# Patient Record
Sex: MALE | Race: BLACK OR AFRICAN AMERICAN | NOT HISPANIC OR LATINO | URBAN - METROPOLITAN AREA
[De-identification: names, ages, dates, MRNs, and addresses within clinical notes are randomized per-mention and may not be internally consistent; named-entity substitution may affect disease eponyms.]

---

## 2023-12-22 ENCOUNTER — OFFICE VISIT (OUTPATIENT)
Dept: PRIMARY CARE | Facility: CLINIC | Age: 43
End: 2023-12-22
Payer: COMMERCIAL

## 2023-12-22 ENCOUNTER — HOSPITAL ENCOUNTER (OUTPATIENT)
Dept: RADIOLOGY | Facility: HOSPITAL | Age: 43
Discharge: HOME | End: 2023-12-22
Payer: COMMERCIAL

## 2023-12-22 ENCOUNTER — LAB (OUTPATIENT)
Dept: LAB | Facility: LAB | Age: 43
End: 2023-12-22
Payer: COMMERCIAL

## 2023-12-22 VITALS
HEIGHT: 71 IN | SYSTOLIC BLOOD PRESSURE: 154 MMHG | OXYGEN SATURATION: 94 % | BODY MASS INDEX: 30.24 KG/M2 | DIASTOLIC BLOOD PRESSURE: 97 MMHG | WEIGHT: 216 LBS | HEART RATE: 105 BPM | TEMPERATURE: 96 F

## 2023-12-22 DIAGNOSIS — Z78.9 ALCOHOL USE: ICD-10-CM

## 2023-12-22 DIAGNOSIS — I10 PRIMARY HYPERTENSION: ICD-10-CM

## 2023-12-22 DIAGNOSIS — F19.90 POLYSUBSTANCE USE DISORDER: ICD-10-CM

## 2023-12-22 DIAGNOSIS — G89.29 CHRONIC PAIN OF RIGHT KNEE: ICD-10-CM

## 2023-12-22 DIAGNOSIS — E11.9 TYPE 2 DIABETES MELLITUS WITHOUT COMPLICATION, WITHOUT LONG-TERM CURRENT USE OF INSULIN (MULTI): ICD-10-CM

## 2023-12-22 DIAGNOSIS — E87.6 HYPOKALEMIA: ICD-10-CM

## 2023-12-22 DIAGNOSIS — E11.9 TYPE 2 DIABETES MELLITUS WITHOUT COMPLICATION, WITHOUT LONG-TERM CURRENT USE OF INSULIN (MULTI): Primary | ICD-10-CM

## 2023-12-22 DIAGNOSIS — M1A.9XX0 CHRONIC GOUT INVOLVING TOE OF RIGHT FOOT WITHOUT TOPHUS, UNSPECIFIED CAUSE: ICD-10-CM

## 2023-12-22 DIAGNOSIS — D64.9 ANEMIA, UNSPECIFIED TYPE: ICD-10-CM

## 2023-12-22 DIAGNOSIS — B35.0 TINEA BARBAE: ICD-10-CM

## 2023-12-22 DIAGNOSIS — M25.561 CHRONIC PAIN OF RIGHT KNEE: ICD-10-CM

## 2023-12-22 LAB
ALBUMIN SERPL BCP-MCNC: 3.9 G/DL (ref 3.4–5)
ALP SERPL-CCNC: 68 U/L (ref 33–120)
ALT SERPL W P-5'-P-CCNC: 29 U/L (ref 10–52)
ANION GAP SERPL CALC-SCNC: 15 MMOL/L (ref 10–20)
AST SERPL W P-5'-P-CCNC: 30 U/L (ref 9–39)
BILIRUB SERPL-MCNC: 0.6 MG/DL (ref 0–1.2)
BUN SERPL-MCNC: 22 MG/DL (ref 6–23)
CALCIUM SERPL-MCNC: 8.8 MG/DL (ref 8.6–10.6)
CHLORIDE SERPL-SCNC: 102 MMOL/L (ref 98–107)
CHOLEST SERPL-MCNC: 258 MG/DL (ref 0–199)
CHOLESTEROL/HDL RATIO: 4.1
CO2 SERPL-SCNC: 29 MMOL/L (ref 21–32)
CREAT SERPL-MCNC: 1.07 MG/DL (ref 0.5–1.3)
ERYTHROCYTE [DISTWIDTH] IN BLOOD BY AUTOMATED COUNT: 13.8 % (ref 11.5–14.5)
EST. AVERAGE GLUCOSE BLD GHB EST-MCNC: 120 MG/DL
FERRITIN SERPL-MCNC: 87 NG/ML (ref 20–300)
GFR SERPL CREATININE-BSD FRML MDRD: 87 ML/MIN/1.73M*2
GGT SERPL-CCNC: 154 U/L (ref 5–64)
GLUCOSE SERPL-MCNC: 97 MG/DL (ref 74–99)
HBA1C MFR BLD: 5.8 %
HCT VFR BLD AUTO: 50 % (ref 41–52)
HDLC SERPL-MCNC: 63.7 MG/DL
HGB BLD-MCNC: 16 G/DL (ref 13.5–17.5)
IRON SATN MFR SERPL: 56 % (ref 25–45)
IRON SERPL-MCNC: 209 UG/DL (ref 35–150)
LDLC SERPL CALC-MCNC: 152 MG/DL
LIPASE SERPL-CCNC: 29 U/L (ref 9–82)
MCH RBC QN AUTO: 29.9 PG (ref 26–34)
MCHC RBC AUTO-ENTMCNC: 32 G/DL (ref 32–36)
MCV RBC AUTO: 94 FL (ref 80–100)
NON HDL CHOLESTEROL: 194 MG/DL (ref 0–149)
NRBC BLD-RTO: 0 /100 WBCS (ref 0–0)
PLATELET # BLD AUTO: 187 X10*3/UL (ref 150–450)
POTASSIUM SERPL-SCNC: 4.1 MMOL/L (ref 3.5–5.3)
PROT SERPL-MCNC: 7.4 G/DL (ref 6.4–8.2)
RBC # BLD AUTO: 5.35 X10*6/UL (ref 4.5–5.9)
SODIUM SERPL-SCNC: 142 MMOL/L (ref 136–145)
TIBC SERPL-MCNC: 370 UG/DL (ref 240–445)
TRIGL SERPL-MCNC: 214 MG/DL (ref 0–149)
UIBC SERPL-MCNC: 161 UG/DL (ref 110–370)
VLDL: 43 MG/DL (ref 0–40)
WBC # BLD AUTO: 9 X10*3/UL (ref 4.4–11.3)

## 2023-12-22 PROCEDURE — 80053 COMPREHEN METABOLIC PANEL: CPT

## 2023-12-22 PROCEDURE — 82728 ASSAY OF FERRITIN: CPT

## 2023-12-22 PROCEDURE — 3077F SYST BP >= 140 MM HG: CPT | Performed by: STUDENT IN AN ORGANIZED HEALTH CARE EDUCATION/TRAINING PROGRAM

## 2023-12-22 PROCEDURE — 83550 IRON BINDING TEST: CPT

## 2023-12-22 PROCEDURE — 85027 COMPLETE CBC AUTOMATED: CPT

## 2023-12-22 PROCEDURE — 73564 X-RAY EXAM KNEE 4 OR MORE: CPT | Mod: RT

## 2023-12-22 PROCEDURE — 73562 X-RAY EXAM OF KNEE 3: CPT | Mod: RIGHT SIDE | Performed by: RADIOLOGY

## 2023-12-22 PROCEDURE — 83690 ASSAY OF LIPASE: CPT

## 2023-12-22 PROCEDURE — 80061 LIPID PANEL: CPT

## 2023-12-22 PROCEDURE — 73562 X-RAY EXAM OF KNEE 3: CPT | Mod: RT

## 2023-12-22 PROCEDURE — 83540 ASSAY OF IRON: CPT

## 2023-12-22 PROCEDURE — 3080F DIAST BP >= 90 MM HG: CPT | Performed by: STUDENT IN AN ORGANIZED HEALTH CARE EDUCATION/TRAINING PROGRAM

## 2023-12-22 PROCEDURE — 83036 HEMOGLOBIN GLYCOSYLATED A1C: CPT

## 2023-12-22 PROCEDURE — 36415 COLL VENOUS BLD VENIPUNCTURE: CPT

## 2023-12-22 PROCEDURE — 99205 OFFICE O/P NEW HI 60 MIN: CPT | Performed by: STUDENT IN AN ORGANIZED HEALTH CARE EDUCATION/TRAINING PROGRAM

## 2023-12-22 PROCEDURE — 82977 ASSAY OF GGT: CPT

## 2023-12-22 RX ORDER — ALLOPURINOL 300 MG/1
300 TABLET ORAL
COMMUNITY
Start: 2023-09-06 | End: 2023-12-22 | Stop reason: SDUPTHER

## 2023-12-22 RX ORDER — NAPROXEN 500 MG/1
TABLET ORAL EVERY 12 HOURS
COMMUNITY
Start: 2021-08-06

## 2023-12-22 RX ORDER — ALBUTEROL SULFATE 0.83 MG/ML
2.5 SOLUTION RESPIRATORY (INHALATION) EVERY 6 HOURS PRN
COMMUNITY
Start: 2019-06-10

## 2023-12-22 RX ORDER — GABAPENTIN 300 MG/1
300 CAPSULE ORAL
COMMUNITY
Start: 2023-09-18 | End: 2024-03-16

## 2023-12-22 RX ORDER — FOLIC ACID 1 MG/1
1 TABLET ORAL DAILY
COMMUNITY
Start: 2019-06-21

## 2023-12-22 RX ORDER — AMLODIPINE BESYLATE 5 MG/1
5 TABLET ORAL
COMMUNITY
Start: 2023-09-06 | End: 2023-12-22 | Stop reason: SDUPTHER

## 2023-12-22 RX ORDER — ATORVASTATIN CALCIUM 80 MG/1
1 TABLET, FILM COATED ORAL DAILY
COMMUNITY
Start: 2018-04-26

## 2023-12-22 RX ORDER — POTASSIUM CHLORIDE 20 MEQ/1
20 TABLET, EXTENDED RELEASE ORAL DAILY
Qty: 30 TABLET | Refills: 11 | Status: SHIPPED | OUTPATIENT
Start: 2023-12-22

## 2023-12-22 RX ORDER — TRIAMCINOLONE ACETONIDE 1 MG/G
CREAM TOPICAL
COMMUNITY
Start: 2018-02-02

## 2023-12-22 RX ORDER — OMEPRAZOLE 40 MG/1
1 CAPSULE, DELAYED RELEASE ORAL DAILY
COMMUNITY
Start: 2021-08-06

## 2023-12-22 RX ORDER — COLCHICINE 0.6 MG/1
0.6 TABLET ORAL
COMMUNITY
Start: 2014-06-25 | End: 2024-03-16

## 2023-12-22 RX ORDER — LORATADINE 10 MG/1
10 TABLET ORAL
COMMUNITY
Start: 2013-09-09

## 2023-12-22 RX ORDER — HYDROCHLOROTHIAZIDE 25 MG/1
1 TABLET ORAL DAILY
COMMUNITY
Start: 2014-10-13 | End: 2023-12-22 | Stop reason: WASHOUT

## 2023-12-22 RX ORDER — NEBULIZER AND COMPRESSOR
EACH MISCELLANEOUS
COMMUNITY
Start: 2023-03-01

## 2023-12-22 RX ORDER — TRAZODONE HYDROCHLORIDE 50 MG/1
TABLET ORAL
COMMUNITY
Start: 2023-08-03

## 2023-12-22 RX ORDER — LOSARTAN POTASSIUM AND HYDROCHLOROTHIAZIDE 12.5; 5 MG/1; MG/1
1 TABLET ORAL
Qty: 30 TABLET | Refills: 11 | Status: SHIPPED | OUTPATIENT
Start: 2023-12-22

## 2023-12-22 RX ORDER — FERROUS SULFATE TAB 325 MG (65 MG ELEMENTAL FE) 325 (65 FE) MG
TAB ORAL
COMMUNITY
Start: 2023-08-03

## 2023-12-22 RX ORDER — ALBUTEROL SULFATE 90 UG/1
AEROSOL, METERED RESPIRATORY (INHALATION)
COMMUNITY
Start: 2018-07-30 | End: 2024-03-01

## 2023-12-22 RX ORDER — ASPIRIN 81 MG
1 TABLET, DELAYED RELEASE (ENTERIC COATED) ORAL DAILY
COMMUNITY
Start: 2022-08-19

## 2023-12-22 RX ORDER — INDOMETHACIN 50 MG/1
50 CAPSULE ORAL
COMMUNITY
Start: 2013-09-09

## 2023-12-22 RX ORDER — ALLOPURINOL 300 MG/1
300 TABLET ORAL
Qty: 30 TABLET | Refills: 11 | Status: SHIPPED | OUTPATIENT
Start: 2023-12-22

## 2023-12-22 RX ORDER — ERGOCALCIFEROL 1.25 MG/1
CAPSULE ORAL
COMMUNITY
Start: 2023-10-30

## 2023-12-22 RX ORDER — METFORMIN HYDROCHLORIDE 500 MG/1
TABLET ORAL
COMMUNITY
Start: 2021-08-13

## 2023-12-22 RX ORDER — POTASSIUM CHLORIDE 20 MEQ/1
TABLET, EXTENDED RELEASE ORAL
COMMUNITY
Start: 2021-08-13 | End: 2023-12-22 | Stop reason: SDUPTHER

## 2023-12-22 RX ORDER — METOPROLOL TARTRATE 50 MG/1
1 TABLET ORAL 2 TIMES DAILY
COMMUNITY
Start: 2021-08-06

## 2023-12-22 RX ORDER — FLUTICASONE PROPIONATE 110 UG/1
2 AEROSOL, METERED RESPIRATORY (INHALATION) 2 TIMES DAILY
COMMUNITY
Start: 2015-12-10 | End: 2024-03-01

## 2023-12-22 RX ORDER — KETOCONAZOLE 20 MG/ML
SHAMPOO, SUSPENSION TOPICAL 2 TIMES WEEKLY
Qty: 120 ML | Refills: 0 | Status: SHIPPED | OUTPATIENT
Start: 2023-12-25 | End: 2024-02-20

## 2023-12-22 RX ORDER — CETIRIZINE HYDROCHLORIDE 10 MG/1
10 TABLET ORAL
COMMUNITY
Start: 2022-01-24

## 2023-12-22 RX ORDER — HYDROCORTISONE 25 MG/G
CREAM TOPICAL 2 TIMES DAILY PRN
Qty: 30 G | Refills: 11 | Status: SHIPPED | OUTPATIENT
Start: 2023-12-22 | End: 2024-12-21

## 2023-12-22 RX ORDER — TIZANIDINE 4 MG/1
4 TABLET ORAL EVERY 6 HOURS PRN
COMMUNITY
Start: 2023-06-05 | End: 2023-12-22 | Stop reason: SDUPTHER

## 2023-12-22 RX ORDER — LEVOFLOXACIN 750 MG/1
1 TABLET ORAL DAILY
COMMUNITY
Start: 2022-06-06 | End: 2023-12-22 | Stop reason: ALTCHOICE

## 2023-12-22 RX ORDER — ASPIRIN 325 MG
TABLET, DELAYED RELEASE (ENTERIC COATED) ORAL
COMMUNITY
Start: 2021-08-13

## 2023-12-22 RX ORDER — IBUPROFEN 800 MG/1
TABLET ORAL
COMMUNITY
Start: 2019-04-01 | End: 2023-12-22 | Stop reason: SINTOL

## 2023-12-22 RX ORDER — MULTIVIT/IRON SULF/FOLIC ACID 15MG-0.4MG
TABLET ORAL
COMMUNITY
Start: 2023-10-30

## 2023-12-22 RX ORDER — HYDROXYZINE HYDROCHLORIDE 25 MG/1
25 TABLET, FILM COATED ORAL EVERY 8 HOURS PRN
COMMUNITY
Start: 2023-09-18

## 2023-12-22 RX ORDER — AMLODIPINE BESYLATE 5 MG/1
5 TABLET ORAL
Qty: 30 TABLET | Refills: 12 | Status: SHIPPED | OUTPATIENT
Start: 2023-12-22 | End: 2025-01-02

## 2023-12-22 RX ORDER — TIZANIDINE 4 MG/1
4 TABLET ORAL EVERY 6 HOURS PRN
Qty: 30 TABLET | Refills: 2 | Status: SHIPPED | OUTPATIENT
Start: 2023-12-22 | End: 2024-02-20

## 2023-12-22 RX ORDER — LOSARTAN POTASSIUM AND HYDROCHLOROTHIAZIDE 12.5; 5 MG/1; MG/1
1 TABLET ORAL
COMMUNITY
Start: 2023-09-06 | End: 2023-12-22 | Stop reason: SDUPTHER

## 2023-12-22 ASSESSMENT — PAIN SCALES - GENERAL: PAINLEVEL: 10-WORST PAIN EVER

## 2023-12-22 NOTE — PROGRESS NOTES
"  Medical record number: [id number]    Subjective   Patient ID: Diego French is a 43 y.o. male who presents for Establish Care (Pt needs a physical and blood work ).    1. Right knee pain  Left knee \"done\" a few years  Right knee swelling, locking,   Last fall Tuesday when the right knee gave out  Used to work landscaping  This does not feel like gout pain    2. HTN  Has been inconsistent in use  Excessive alcohol use: drinks every night from 7-8 pm until he blacks out  Also some PCP use, up to 2 times a week    3. HLD  On atorvastatin 80 mg    4. Anemia  Hgb down to 12 recently    5. Hypokalemia  3.3 most recently    6. Polysubstance use  ethanol, PCP  Black out drinking  PCP use up to twice a week  Using with friends, not housemates    7. 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 Tinea barbae  3 lesions of the chin, lateral to mouth  Not angular location  Pruritic, bleed once he picks them         Social History:  - Lives with wife and son  - Feels safe YES  - Tobacco or vapin cigarettes a day, at most 1 ppd since age 13: total pack years >35  - Alcohol use: daily, black out drinking in the evenings  - Marijuana use: no  - Illicit drug use: PCP    Family History:  Did not discuss    Past Medical History:  Did not discuss    Past Surgical History:  Did not discuss    Review of Systems   All other systems reviewed and are negative.      Objective   BP (!) 154/97 (BP Location: Left arm, Patient Position: Sitting)   Pulse 105   Temp 35.6 °C (96 °F) (Temporal)   Ht 1.803 m (5' 11\")   Wt 98 kg (216 lb)   SpO2 94%   BMI 30.13 kg/m²     Physical Exam  Vitals reviewed.   Constitutional:       General: He is not in acute distress.  HENT:      Head: Normocephalic and atraumatic.      Nose: Nose normal.      Mouth/Throat:      Mouth: Mucous membranes are moist.      Pharynx: Oropharynx is clear.        Comments: Raised, mildly erythematous round lesions that contain central hair follicles. Some bleeding from areas " that were picked by patient. Facial hair present. No alopecia.  Eyes:      Extraocular Movements: Extraocular movements intact.      Conjunctiva/sclera: Conjunctivae normal.      Pupils: Pupils are equal, round, and reactive to light.   Cardiovascular:      Rate and Rhythm: Normal rate.      Pulses: Normal pulses.      Heart sounds: Normal heart sounds. No murmur heard.     No friction rub. No gallop.   Pulmonary:      Effort: Pulmonary effort is normal.      Breath sounds: Normal breath sounds.   Abdominal:      General: Abdomen is flat. Bowel sounds are normal.      Palpations: Abdomen is soft.   Musculoskeletal:      Cervical back: Normal range of motion and neck supple.      Right knee: Swelling, bony tenderness and crepitus present. No erythema, ecchymosis or lacerations. Decreased range of motion. Tenderness present over the patellar tendon. No ACL laxity or PCL laxity. Normal meniscus and normal patellar mobility.      Instability Tests: Anterior drawer test negative. Posterior drawer test negative. Anterior Lachman test negative. Medial Bette test negative and lateral Bette test negative.      Left knee: Normal.   Skin:     General: Skin is warm and dry.      Capillary Refill: Capillary refill takes less than 2 seconds.   Neurological:      General: No focal deficit present.      Mental Status: He is alert and oriented to person, place, and time.   Psychiatric:         Mood and Affect: Mood normal.         Behavior: Behavior normal.         PHYSICAL EXAM:    Assessment/Plan   Problem List Items Addressed This Visit    None  Visit Diagnoses         Codes    Type 2 diabetes mellitus without complication, without long-term current use of insulin (CMS/LTAC, located within [facility])    -  Primary E11.9    Relevant Orders    Hemoglobin A1c    Primary hypertension     I10    Relevant Medications    losartan-hydrochlorothiazide (Hyzaar) 50-12.5 mg tablet    amLODIPine (Norvasc) 5 mg tablet    Other Relevant Orders    CBC    Chronic  pain of right knee     M25.561, G89.29    Relevant Medications    tiZANidine (Zanaflex) 4 mg tablet    Other Relevant Orders    XR knee right 3 views (Completed)    Alcohol use     Z78.9    Relevant Orders    CBC    Comprehensive metabolic panel    Gamma GT    Lipase    Lipid panel    Polysubstance use disorder     F19.90    Chronic gout involving toe of right foot without tophus, unspecified cause     M1A.9XX0    Relevant Medications    allopurinol (Zyloprim) 300 mg tablet    Hypokalemia     E87.6    Relevant Medications    potassium chloride CR 20 mEq ER tablet    Anemia, unspecified type     D64.9    Relevant Orders    CBC    Ferritin    Iron and TIBC    Tinea barbae     B35.0    Relevant Medications    ketoconazole (NIZOral) 2 % shampoo (Start on 12/25/2023)    hydrocortisone 2.5 % cream                   -------------------------------------------------------------------------------    **This note was entered into the electronic medical record using Dragon medical dictation software, and was not edited thereafter. Please excuse any errors of spelling or grammar.**    -------------------------------------------------------------------------------    OVERALL PLAN:  [ ] Right knee pain: differential includes patellofemoral syndrome, gout, degenerative joint disease - c/w naproxen, XR today  [ ] CBC + iron studies  [ ] CMP, lipids, GGT, lipase  [ ]     AFTER VISIT CONSIDERATIONS:  [ ] PT following XR results  [ ] FUV for pain of right knee  [ ] MRI if indicated for right kne pain  [ ] discussion of anxiety and depression  [ ] Health maintenance for future visits: age 48, needs LDCT, colonoscopy  [ ] complete PMH, PSH, FH

## 2024-02-19 DIAGNOSIS — M25.561 CHRONIC PAIN OF RIGHT KNEE: ICD-10-CM

## 2024-02-19 DIAGNOSIS — G89.29 CHRONIC PAIN OF RIGHT KNEE: ICD-10-CM

## 2024-02-19 DIAGNOSIS — B35.0 TINEA BARBAE: ICD-10-CM

## 2024-02-20 RX ORDER — KETOCONAZOLE 20 MG/ML
SHAMPOO, SUSPENSION TOPICAL
Qty: 120 ML | Refills: 0 | Status: SHIPPED | OUTPATIENT
Start: 2024-02-20

## 2024-02-20 RX ORDER — TIZANIDINE 4 MG/1
4 TABLET ORAL EVERY 6 HOURS PRN
Qty: 30 TABLET | Refills: 2 | Status: SHIPPED | OUTPATIENT
Start: 2024-02-20 | End: 2024-05-16

## 2024-02-28 DIAGNOSIS — J45.909 ASTHMA, UNSPECIFIED ASTHMA SEVERITY, UNSPECIFIED WHETHER COMPLICATED, UNSPECIFIED WHETHER PERSISTENT (HHS-HCC): Primary | ICD-10-CM

## 2024-02-28 NOTE — TELEPHONE ENCOUNTER
Pt called requesting refill of albuterol inhaler, and fluticasone inhaler. Orders pended via pharmacy. Routed to provider via this nurse.

## 2024-03-01 ENCOUNTER — DOCUMENTATION (OUTPATIENT)
Dept: PRIMARY CARE | Facility: CLINIC | Age: 44
End: 2024-03-01
Payer: COMMERCIAL

## 2024-03-01 RX ORDER — DEXAMETHASONE 4 MG/1
2 TABLET ORAL 2 TIMES DAILY
Qty: 36 G | Refills: 3 | Status: SHIPPED | OUTPATIENT
Start: 2024-03-01

## 2024-03-01 RX ORDER — ALBUTEROL SULFATE 90 UG/1
AEROSOL, METERED RESPIRATORY (INHALATION)
Qty: 36 G | Refills: 1 | Status: SHIPPED | OUTPATIENT
Start: 2024-03-01

## 2024-03-01 NOTE — PROGRESS NOTES
Spoke with patient over the phone, following receipt of message that he is having knee pain. This is a chronic issue, and there is some concern for gout. He also endorses a trauma to his right great toe with pain and cannot move it right now. The right knee is swollen, warm, red, and he also endorses fever and chills. Took NyQuil last night and still feeling poorly. Onset of new knee pain was acute 2 days ago (on top of chronic pain for years).     I am concerned for septic joint. I do not have PM appointments. There are no open slots in our clinic today, and besides that, I want him to report to an ED for evaluation of septic knee joint, and whether he warrants effusion drainage and analysis, XR, analgesia, etc.    Patient verbalized understanding, but will go to the ED in the morning, as he knows Friday evening ER will be very full.

## 2024-03-07 ENCOUNTER — OFFICE VISIT (OUTPATIENT)
Dept: OPHTHALMOLOGY | Facility: CLINIC | Age: 44
End: 2024-03-07
Payer: COMMERCIAL

## 2024-03-07 DIAGNOSIS — H52.203 MYOPIA OF BOTH EYES WITH ASTIGMATISM AND PRESBYOPIA: ICD-10-CM

## 2024-03-07 DIAGNOSIS — H52.213 IRREGULAR ASTIGMATISM OF BOTH EYES: ICD-10-CM

## 2024-03-07 DIAGNOSIS — H18.603 KERATOCONUS OF BOTH EYES: Primary | ICD-10-CM

## 2024-03-07 DIAGNOSIS — H52.13 MYOPIA OF BOTH EYES WITH ASTIGMATISM AND PRESBYOPIA: ICD-10-CM

## 2024-03-07 DIAGNOSIS — H35.413 LATTICE DEGENERATION OF BOTH RETINAS: ICD-10-CM

## 2024-03-07 DIAGNOSIS — H52.4 MYOPIA OF BOTH EYES WITH ASTIGMATISM AND PRESBYOPIA: ICD-10-CM

## 2024-03-07 PROCEDURE — 92004 COMPRE OPH EXAM NEW PT 1/>: CPT | Performed by: STUDENT IN AN ORGANIZED HEALTH CARE EDUCATION/TRAINING PROGRAM

## 2024-03-07 PROCEDURE — 92015 DETERMINE REFRACTIVE STATE: CPT | Performed by: STUDENT IN AN ORGANIZED HEALTH CARE EDUCATION/TRAINING PROGRAM

## 2024-03-07 PROCEDURE — 92025 CPTRIZED CORNEAL TOPOGRAPHY: CPT | Performed by: STUDENT IN AN ORGANIZED HEALTH CARE EDUCATION/TRAINING PROGRAM

## 2024-03-07 ASSESSMENT — EXTERNAL EXAM - LEFT EYE: OS_EXAM: NORMAL

## 2024-03-07 ASSESSMENT — VISUAL ACUITY
METHOD: SNELLEN - LINEAR
OS_PH_SC: 20/70-
OD_PH_SC: 20/60-
OS_SC: 20/80
OD_SC: 20/150

## 2024-03-07 ASSESSMENT — CUP TO DISC RATIO
OS_RATIO: .3
OD_RATIO: .3

## 2024-03-07 ASSESSMENT — REFRACTION_MANIFEST
OS_SPHERE: -2.25
OD_CYLINDER: -2.75
OD_AXIS: 049
OD_ADD: +1.50
OS_SPHERE: -2.50
OS_CYLINDER: -5.75
OD_SPHERE: -1.75
OD_CYLINDER: -2.50
OS_AXIS: 135
OD_AXIS: 045
OS_ADD: +1.50
OD_SPHERE: -1.75
OS_AXIS: 135
OS_CYLINDER: -6.00
METHOD_AUTOREFRACTION: 1

## 2024-03-07 ASSESSMENT — CONF VISUAL FIELD
OD_NORMAL: 1
OD_INFERIOR_TEMPORAL_RESTRICTION: 0
OS_SUPERIOR_TEMPORAL_RESTRICTION: 0
OS_SUPERIOR_NASAL_RESTRICTION: 0
OD_INFERIOR_NASAL_RESTRICTION: 0
OD_SUPERIOR_NASAL_RESTRICTION: 0
OS_INFERIOR_TEMPORAL_RESTRICTION: 0
OS_NORMAL: 1
OS_INFERIOR_NASAL_RESTRICTION: 0
METHOD: COUNTING FINGERS
OD_SUPERIOR_TEMPORAL_RESTRICTION: 0

## 2024-03-07 ASSESSMENT — ENCOUNTER SYMPTOMS
CONSTITUTIONAL NEGATIVE: 0
ENDOCRINE NEGATIVE: 0
RESPIRATORY NEGATIVE: 0
MUSCULOSKELETAL NEGATIVE: 0
EYES NEGATIVE: 0
ALLERGIC/IMMUNOLOGIC NEGATIVE: 0
NEUROLOGICAL NEGATIVE: 0
PSYCHIATRIC NEGATIVE: 0
CARDIOVASCULAR NEGATIVE: 1
HEMATOLOGIC/LYMPHATIC NEGATIVE: 0
GASTROINTESTINAL NEGATIVE: 0

## 2024-03-07 ASSESSMENT — TONOMETRY
OD_IOP_MMHG: 14
OS_IOP_MMHG: 12
IOP_METHOD: GOLDMANN APPLANATION

## 2024-03-07 ASSESSMENT — SLIT LAMP EXAM - LIDS
COMMENTS: TR MGD
COMMENTS: TR MGD

## 2024-03-07 ASSESSMENT — EXTERNAL EXAM - RIGHT EYE: OD_EXAM: NORMAL

## 2024-03-07 NOTE — PROGRESS NOTES
Assessment/Plan   Diagnoses and all orders for this visit:  Keratoconus of both eyes  -     Corneal Topography - OU - Both Eyes  Irregular astigmatism of both eyes  -     Corneal Topography - OU - Both Eyes  Myopia of both eyes with astigmatism and presbyopia    Previously seen by Dr. Taylor in 2019 and was referred to Dr. Garcia for scleral lens fitting. No notes are seen in the system, however patient reports he tried sclerals before and could not get them in. Explained that his vision would likely be improved with scleral lenses. Declined scleral lens fitting. Explained importance of not rubbing eyes. New spec rx released today per patient request. Ocular health wnl for age OU. Monitor 1 year or sooner prn if interested in CL fit.   Stable topography measurements to previous without signs of progression.     Lattice degeneration of both retinas  The nature of lattice degeneration was discussed with the patient, including the increased risk of retinal breaks and retinal detachment. The patient was advised to return immediately for new flashes or floaters.     RTC 1 year for annual with DFE, MRX, and CL exam if interested

## 2024-03-08 LAB
CENTRAL CORNEA THICKNESS (OD): 490 MICRONS
CENTRAL CORNEA THICKNESS (OS): 463 MICRONS
KMAX (OD): 48.4 DIOPTERS
KMAX (OS): 54.9 DIOPTERS
PACH THINNEST (OD): 481 MICRONS
PACH THINNEST (OS): 431 MICRONS
SIMK FLAT (OD): 44.2 DIOPTERS
SIMK FLAT (OS): 45.6 DIOPTERS
SIMK STEEP (OD): 46.7 DIOPTERS
SIMK STEEP (OS): 51 DIOPTERS

## 2024-03-11 ENCOUNTER — TELEPHONE (OUTPATIENT)
Dept: PRIMARY CARE | Facility: CLINIC | Age: 44
End: 2024-03-11
Payer: COMMERCIAL

## 2024-03-11 NOTE — TELEPHONE ENCOUNTER
Communication received pt requesting callback r/t obtaining rx for pain med r/t B/L knee pain with edema. Noted provider spoke with pt 3 days ago, and instructed pt to go to ED d/t concerns of septic joint, and/or other possible needs of treatment, in which pt agreed he would go next morning when ED not so busy. Call placed to pt to inform need to be seen for further examination. Pt stated he did not want to go to ED d/t several previous trips when they would just give an XR and send home. Pt stated he has to catch the bus and he feels that is wasting money, that he doesn't really have to waste on bus tickets. Attempt to schedule pt prior to March 20th appt, but that date is earliest date available with any provider in clinic. Pt requests provider to send rx for pain in the meantime. Message sent to provider.

## 2024-03-11 NOTE — PROGRESS NOTES
Called the listed phone number, which referenced a different phone number [phone] where the patient could be reached. I called this number twice with no answer.    This was in response to a message patient left asking for medication in regard to his ongoing knee pain. When we last spoke, he described symptoms that were concerning for septic knee arthritis. I would very much like to speak to him to see if symptoms are still concerning and if he warrants ED assessment. Both during our previous phone conversation and from the message that he left, he is rather adamant about not going to the emergency room as he feels that they never adequately assess or treat his pain.  I am still concerned that he has a septic arthritis.    Though we could theoretically aspirate his knee in clinic, send off the aspirate for culture, and order blood cultures and other workup, I am adamant that this ought to just be done in the emergency room and not here in clinic.  I feel that doing so would not be in keeping with the standard of care.    Will attempt to speak with the patient again tomorrow.

## 2024-03-20 ENCOUNTER — HOSPITAL ENCOUNTER (EMERGENCY)
Facility: HOSPITAL | Age: 44
Discharge: HOME | End: 2024-03-20
Payer: MEDICARE

## 2024-03-20 ENCOUNTER — CLINICAL SUPPORT (OUTPATIENT)
Dept: EMERGENCY MEDICINE | Facility: HOSPITAL | Age: 44
End: 2024-03-20
Payer: MEDICARE

## 2024-03-20 ENCOUNTER — APPOINTMENT (OUTPATIENT)
Dept: RADIOLOGY | Facility: HOSPITAL | Age: 44
End: 2024-03-20
Payer: MEDICARE

## 2024-03-20 VITALS
RESPIRATION RATE: 16 BRPM | TEMPERATURE: 98.4 F | HEART RATE: 100 BPM | OXYGEN SATURATION: 95 % | SYSTOLIC BLOOD PRESSURE: 142 MMHG | DIASTOLIC BLOOD PRESSURE: 87 MMHG

## 2024-03-20 DIAGNOSIS — R07.9 CHEST PAIN, UNSPECIFIED TYPE: ICD-10-CM

## 2024-03-20 DIAGNOSIS — M25.461 EFFUSION OF RIGHT KNEE: Primary | ICD-10-CM

## 2024-03-20 LAB
ALBUMIN SERPL BCP-MCNC: 4.1 G/DL (ref 3.4–5)
ALP SERPL-CCNC: 69 U/L (ref 33–120)
ALT SERPL W P-5'-P-CCNC: 27 U/L (ref 10–52)
ANION GAP SERPL CALC-SCNC: 15 MMOL/L (ref 10–20)
AST SERPL W P-5'-P-CCNC: 33 U/L (ref 9–39)
ATRIAL RATE: 106 BPM
BASOPHILS # BLD AUTO: 0.07 X10*3/UL (ref 0–0.1)
BASOPHILS NFR BLD AUTO: 0.9 %
BILIRUB SERPL-MCNC: 0.4 MG/DL (ref 0–1.2)
BUN SERPL-MCNC: 23 MG/DL (ref 6–23)
CALCIUM SERPL-MCNC: 9.5 MG/DL (ref 8.6–10.6)
CARDIAC TROPONIN I PNL SERPL HS: 19 NG/L (ref 0–53)
CHLORIDE SERPL-SCNC: 102 MMOL/L (ref 98–107)
CO2 SERPL-SCNC: 27 MMOL/L (ref 21–32)
CREAT SERPL-MCNC: 1.29 MG/DL (ref 0.5–1.3)
EGFRCR SERPLBLD CKD-EPI 2021: 70 ML/MIN/1.73M*2
EOSINOPHIL # BLD AUTO: 0.14 X10*3/UL (ref 0–0.7)
EOSINOPHIL NFR BLD AUTO: 1.7 %
ERYTHROCYTE [DISTWIDTH] IN BLOOD BY AUTOMATED COUNT: 12.7 % (ref 11.5–14.5)
GLUCOSE SERPL-MCNC: 105 MG/DL (ref 74–99)
HCT VFR BLD AUTO: 48.1 % (ref 41–52)
HGB BLD-MCNC: 16.9 G/DL (ref 13.5–17.5)
IMM GRANULOCYTES # BLD AUTO: 0.03 X10*3/UL (ref 0–0.7)
IMM GRANULOCYTES NFR BLD AUTO: 0.4 % (ref 0–0.9)
LYMPHOCYTES # BLD AUTO: 1.8 X10*3/UL (ref 1.2–4.8)
LYMPHOCYTES NFR BLD AUTO: 22.4 %
MCH RBC QN AUTO: 31 PG (ref 26–34)
MCHC RBC AUTO-ENTMCNC: 35.1 G/DL (ref 32–36)
MCV RBC AUTO: 88 FL (ref 80–100)
MONOCYTES # BLD AUTO: 0.79 X10*3/UL (ref 0.1–1)
MONOCYTES NFR BLD AUTO: 9.8 %
NEUTROPHILS # BLD AUTO: 5.22 X10*3/UL (ref 1.2–7.7)
NEUTROPHILS NFR BLD AUTO: 64.8 %
NRBC BLD-RTO: 0 /100 WBCS (ref 0–0)
P AXIS: 60 DEGREES
P OFFSET: 191 MS
P ONSET: 140 MS
PLATELET # BLD AUTO: 240 X10*3/UL (ref 150–450)
POTASSIUM SERPL-SCNC: 5 MMOL/L (ref 3.5–5.3)
PR INTERVAL: 160 MS
PROT SERPL-MCNC: 8.3 G/DL (ref 6.4–8.2)
Q ONSET: 220 MS
QRS COUNT: 18 BEATS
QRS DURATION: 90 MS
QT INTERVAL: 314 MS
QTC CALCULATION(BAZETT): 417 MS
QTC FREDERICIA: 379 MS
R AXIS: 13 DEGREES
RBC # BLD AUTO: 5.45 X10*6/UL (ref 4.5–5.9)
SODIUM SERPL-SCNC: 139 MMOL/L (ref 136–145)
T AXIS: 61 DEGREES
T OFFSET: 377 MS
VENTRICULAR RATE: 106 BPM
WBC # BLD AUTO: 8.1 X10*3/UL (ref 4.4–11.3)

## 2024-03-20 PROCEDURE — 71046 X-RAY EXAM CHEST 2 VIEWS: CPT | Mod: FOREIGN READ | Performed by: RADIOLOGY

## 2024-03-20 PROCEDURE — 93005 ELECTROCARDIOGRAM TRACING: CPT

## 2024-03-20 PROCEDURE — 36415 COLL VENOUS BLD VENIPUNCTURE: CPT | Performed by: PHYSICIAN ASSISTANT

## 2024-03-20 PROCEDURE — 80053 COMPREHEN METABOLIC PANEL: CPT | Performed by: PHYSICIAN ASSISTANT

## 2024-03-20 PROCEDURE — 96374 THER/PROPH/DIAG INJ IV PUSH: CPT

## 2024-03-20 PROCEDURE — 2500000004 HC RX 250 GENERAL PHARMACY W/ HCPCS (ALT 636 FOR OP/ED): Mod: SE | Performed by: PHYSICIAN ASSISTANT

## 2024-03-20 PROCEDURE — 93010 ELECTROCARDIOGRAM REPORT: CPT | Performed by: PHYSICIAN ASSISTANT

## 2024-03-20 PROCEDURE — 99285 EMERGENCY DEPT VISIT HI MDM: CPT | Performed by: PHYSICIAN ASSISTANT

## 2024-03-20 PROCEDURE — 85025 COMPLETE CBC W/AUTO DIFF WBC: CPT | Performed by: PHYSICIAN ASSISTANT

## 2024-03-20 PROCEDURE — 71046 X-RAY EXAM CHEST 2 VIEWS: CPT

## 2024-03-20 PROCEDURE — 84484 ASSAY OF TROPONIN QUANT: CPT | Performed by: PHYSICIAN ASSISTANT

## 2024-03-20 PROCEDURE — 73562 X-RAY EXAM OF KNEE 3: CPT | Mod: RT

## 2024-03-20 PROCEDURE — 99284 EMERGENCY DEPT VISIT MOD MDM: CPT | Mod: 25

## 2024-03-20 RX ORDER — ACETAMINOPHEN 325 MG/1
650 TABLET ORAL EVERY 6 HOURS PRN
Qty: 20 TABLET | Refills: 0 | Status: SHIPPED | OUTPATIENT
Start: 2024-03-20 | End: 2024-03-25

## 2024-03-20 RX ORDER — NAPROXEN 500 MG/1
500 TABLET ORAL
Qty: 14 TABLET | Refills: 0 | Status: SHIPPED | OUTPATIENT
Start: 2024-03-20 | End: 2024-03-27

## 2024-03-20 RX ORDER — CYCLOBENZAPRINE HCL 10 MG
10 TABLET ORAL 3 TIMES DAILY PRN
Qty: 9 TABLET | Refills: 0 | Status: SHIPPED | OUTPATIENT
Start: 2024-03-20 | End: 2024-03-23

## 2024-03-20 RX ORDER — KETOROLAC TROMETHAMINE 15 MG/ML
15 INJECTION, SOLUTION INTRAMUSCULAR; INTRAVENOUS ONCE
Status: COMPLETED | OUTPATIENT
Start: 2024-03-20 | End: 2024-03-20

## 2024-03-20 RX ADMIN — KETOROLAC TROMETHAMINE 15 MG: 15 INJECTION, SOLUTION INTRAMUSCULAR; INTRAVENOUS at 15:44

## 2024-03-20 ASSESSMENT — HEART SCORE
TROPONIN: LESS THAN OR EQUAL TO NORMAL LIMIT
AGE: 45-64
HISTORY: SLIGHTLY SUSPICIOUS
ECG: NORMAL
RISK FACTORS: 1-2 RISK FACTORS
HEART SCORE: 2

## 2024-03-20 ASSESSMENT — COLUMBIA-SUICIDE SEVERITY RATING SCALE - C-SSRS
6. HAVE YOU EVER DONE ANYTHING, STARTED TO DO ANYTHING, OR PREPARED TO DO ANYTHING TO END YOUR LIFE?: NO
2. HAVE YOU ACTUALLY HAD ANY THOUGHTS OF KILLING YOURSELF?: NO
1. IN THE PAST MONTH, HAVE YOU WISHED YOU WERE DEAD OR WISHED YOU COULD GO TO SLEEP AND NOT WAKE UP?: NO

## 2024-03-20 ASSESSMENT — PAIN DESCRIPTION - DESCRIPTORS: DESCRIPTORS: BURNING

## 2024-03-20 NOTE — Clinical Note
Diego French was seen and treated in our emergency department on 3/20/2024.  He may return to work on 03/21/2024.       If you have any questions or concerns, please don't hesitate to call.      Lamar Bishop PA-C

## 2024-03-20 NOTE — ED PROVIDER NOTES
This is a 43-year-old male with past medical history of hypertension as well as chronic right knee pain who presents to the ED with multiple medical complaints including chest pain that began yesterday as well as his chronic right knee pain.  Patient states that the chest pain began yesterday while he was resting.  It is a tingling sharp pain on the left side of his chest that does not radiate and is not worse with movement.  No associated shortness of breath, neck pain, arm pain, palpitations, diaphoresis, abdominal pain, nausea, vomiting, or diarrhea.  He does state it feels somewhat like gas.  He denies any abdominal additionally patient endorses having chronic knee pain.  He states that he had an outpatient appointment today, however at the wrong time for the appointment so he missed it.  He endorses chronic right knee pain and swelling without any associated redness.  No acute changes to this pain.  No new injuries or trauma.  No associated fevers or chills.      History provided by:  Patient   used: No             Visit Vitals  /87   Pulse 100   Temp 36.9 °C (98.4 °F) (Temporal)   Resp 16   SpO2 95%   Smoking Status Some Days          Physical Exam     Physical exam:   General: Vitals noted, no distress. Afebrile.   EENT:  Hearing grossly intact. Normal phonation. MMM. Airway patient. PERRL. EOMI.   Neck: No midline tenderness or paraspinal tenderness. FROM.   Cardiac: Regular, rate, rhythm. Normal S1 and S2.  No murmurs, gallops, rubs.   Pulmonary: Good air exchange. Lungs clear bilaterally. No wheezes, rhonchi, rales. No accessory muscle use.  No reproducible chest wall tenderness to palpation.  Abdomen: Soft, nonsurgical. Nontender. No peritoneal signs. Normoactive bowel sounds.   Back: No CVA tenderness. No midline tenderness or paraspinal tenderness. No obvious deformity.   Extremities: No peripheral edema.  Full range of motion. Moves all extremities freely.  Moderate sized  joint effusion to the right knee with associated tenderness palpation over the medial joint line.  No ligamental laxity.  Full range of motion of the knee without difficulty.  No associated erythema or excess warmth.   Skin: No rash. Warm and Dry.   Neuro: No focal neurologic deficits. CN 2-12 grossly intact. Sensation equal bilaterally. No weakness.         Labs Reviewed   COMPREHENSIVE METABOLIC PANEL - Abnormal       Result Value    Glucose 105 (*)     Sodium 139      Potassium 5.0      Chloride 102      Bicarbonate 27      Anion Gap 15      Urea Nitrogen 23      Creatinine 1.29      eGFR 70      Calcium 9.5      Albumin 4.1      Alkaline Phosphatase 69      Total Protein 8.3 (*)     AST 33      Bilirubin, Total 0.4      ALT 27     TROPONIN I, HIGH SENSITIVITY - Normal    Troponin I, High Sensitivity 19      Narrative:     Less than 99th percentile of normal range cutoff-  Female and children under 18 years old <35 ng/L; Male <54 ng/L: Negative  Repeat testing should be performed if clinically indicated.     Female and children under 18 years old  ng/L; Male  ng/L:  Consistent with possible cardiac damage and possible increased clinical   risk. Serial measurements may help to assess extent of myocardial damage.     >120 ng/L: Consistent with cardiac damage, increased clinical risk and  myocardial infarction. Serial measurements may help assess extent of   myocardial damage.      NOTE: Children less than 1 year old may have higher baseline troponin   levels and results should be interpreted in conjunction with the overall   clinical context.    NOTE: Troponin I testing is performed using a different   testing methodology at [facility] than at other   [facility] hospitals. Direct result comparisons should only   be made within the same method.     CBC WITH AUTO DIFFERENTIAL    WBC 8.1      nRBC 0.0      RBC 5.45      Hemoglobin 16.9      Hematocrit 48.1      MCV 88      MCH 31.0      MCHC 35.1       RDW 12.7      Platelets 240      Neutrophils % 64.8      Immature Granulocytes %, Automated 0.4      Lymphocytes % 22.4      Monocytes % 9.8      Eosinophils % 1.7      Basophils % 0.9      Neutrophils Absolute 5.22      Immature Granulocytes Absolute, Automated 0.03      Lymphocytes Absolute 1.80      Monocytes Absolute 0.79      Eosinophils Absolute 0.14      Basophils Absolute 0.07         XR knee right 3 views   Final Result   No acute osseous abnormality.   Signed by Alberto Lane MD      XR chest 2 views   Final Result   Mild vascular congestion without overt edema.   Signed by Alberto Lane MD            ED Course & MDM     Medical Decision Making  This is a 43-year-old male with a past medical history of chronic right knee pain as well as hypertension who presents to the ED with chest pain that began yesterday as well as chronic right knee pain.  Vital stable upon arrival to the ED.  On examination lungs clear to auscultation.  No audible cardiac murmurs.  Abdomen is no associated erythema or excess warmth.  Full range of motion of the joint.  Neurovascular intact distal to the area of pain.  Based on the patient's description of his symptoms and his physical examination I have very low suspicion for septic arthritis at this time.  Chest x-ray as well as x-ray of his knee, laboratory studies, and EKG.  Patient medicated with Toradol for his pain.  On reevaluation he was feeling improved.  Chest x-ray showed some mild pulmonary edema but was otherwise grossly unremarkable.  X-ray of patient's knee showed an effusion but no acute osseous abnormality.  CBC and CMP both grossly unremarkable.  Troponin 19.  EKG showed no acute ischemic changes.  Heart score today is 2 and I have very low suspicion for ACS based on patient's unremarkable workup and description of his symptoms at this time.  He was advised to follow with his primary care provider as an outpatient if his chest discomfort continues.  He was  advised to follow-up orthopedics for his chronic knee pain.  He was given prescriptions for naproxen, Flexeril, and Tylenol for his symptoms at home.  He was given a note for his job and was discharged from the emergency department in stable condition.    Amount and/or Complexity of Data Reviewed  Labs: ordered.  Radiology: ordered and independent interpretation performed.     Details: Chest x-ray without visualized pneumonia or pneumothorax  X-ray knee without any visualized fracture or dislocation  ECG/medicine tests: ordered and independent interpretation performed.     Details: EKG sinus tachycardia at 106 bpm.  No acute ST elevation or depression.  No T wave inversions.  Normal axis.  Appears unchanged versus previous EKGs.    Risk  Prescription drug management.         Diagnoses as of 03/20/24 1805   Effusion of right knee   Chest pain, unspecified type       Procedures    GALA Maier, PA-C     Lamar Bishop PA-C  03/20/24 1808

## 2024-04-11 ENCOUNTER — DOCUMENTATION (OUTPATIENT)
Dept: PRIMARY CARE | Facility: CLINIC | Age: 44
End: 2024-04-11
Payer: COMMERCIAL

## 2024-04-11 NOTE — PROGRESS NOTES
Multiple calls attempted. Voicemail does not ID patient. No message left. Received office message that he wished to discuss carpal tunnel syndrome. Will attempt to call again later.

## 2024-05-09 ENCOUNTER — APPOINTMENT (OUTPATIENT)
Dept: PRIMARY CARE | Facility: CLINIC | Age: 44
End: 2024-05-09
Payer: COMMERCIAL

## 2024-05-10 ENCOUNTER — CLINICAL SUPPORT (OUTPATIENT)
Dept: EMERGENCY MEDICINE | Facility: HOSPITAL | Age: 44
End: 2024-05-10
Payer: COMMERCIAL

## 2024-05-10 ENCOUNTER — HOSPITAL ENCOUNTER (EMERGENCY)
Facility: HOSPITAL | Age: 44
Discharge: LEFT BEFORE DISCHARGE | End: 2024-05-10
Attending: EMERGENCY MEDICINE
Payer: COMMERCIAL

## 2024-05-10 VITALS
SYSTOLIC BLOOD PRESSURE: 170 MMHG | BODY MASS INDEX: 30.8 KG/M2 | DIASTOLIC BLOOD PRESSURE: 93 MMHG | HEIGHT: 71 IN | RESPIRATION RATE: 15 BRPM | HEART RATE: 100 BPM | WEIGHT: 220 LBS | OXYGEN SATURATION: 95 % | TEMPERATURE: 98.1 F

## 2024-05-10 DIAGNOSIS — R07.9 CHEST PAIN, UNSPECIFIED TYPE: Primary | ICD-10-CM

## 2024-05-10 LAB
ATRIAL RATE: 98 BPM
P AXIS: 49 DEGREES
P OFFSET: 186 MS
P ONSET: 128 MS
PR INTERVAL: 186 MS
Q ONSET: 221 MS
QRS COUNT: 16 BEATS
QRS DURATION: 74 MS
QT INTERVAL: 354 MS
QTC CALCULATION(BAZETT): 451 MS
QTC FREDERICIA: 417 MS
R AXIS: 11 DEGREES
T AXIS: 53 DEGREES
T OFFSET: 398 MS
VENTRICULAR RATE: 98 BPM

## 2024-05-10 PROCEDURE — 93005 ELECTROCARDIOGRAM TRACING: CPT | Mod: 77

## 2024-05-10 PROCEDURE — 99283 EMERGENCY DEPT VISIT LOW MDM: CPT | Mod: 25

## 2024-05-10 PROCEDURE — 93005 ELECTROCARDIOGRAM TRACING: CPT

## 2024-05-10 PROCEDURE — 99284 EMERGENCY DEPT VISIT MOD MDM: CPT | Performed by: EMERGENCY MEDICINE

## 2024-05-10 ASSESSMENT — PAIN DESCRIPTION - LOCATION: LOCATION: CHEST

## 2024-05-10 ASSESSMENT — PAIN DESCRIPTION - PAIN TYPE: TYPE: ACUTE PAIN

## 2024-05-10 ASSESSMENT — COLUMBIA-SUICIDE SEVERITY RATING SCALE - C-SSRS
2. HAVE YOU ACTUALLY HAD ANY THOUGHTS OF KILLING YOURSELF?: NO
6. HAVE YOU EVER DONE ANYTHING, STARTED TO DO ANYTHING, OR PREPARED TO DO ANYTHING TO END YOUR LIFE?: NO
1. IN THE PAST MONTH, HAVE YOU WISHED YOU WERE DEAD OR WISHED YOU COULD GO TO SLEEP AND NOT WAKE UP?: NO

## 2024-05-10 ASSESSMENT — PAIN SCALES - GENERAL: PAINLEVEL_OUTOF10: 10 - WORST POSSIBLE PAIN

## 2024-05-10 ASSESSMENT — PAIN - FUNCTIONAL ASSESSMENT: PAIN_FUNCTIONAL_ASSESSMENT: 0-10

## 2024-05-10 NOTE — ED PROVIDER NOTES
EMERGENCY DEPARTMENT ENCOUNTER      Pt Name: Diego French  MRN: [id number]  Birthdate 1980  Date of evaluation: 5/10/2024  Provider: Laron Bangura MD    CHIEF COMPLAINT       Chief Complaint   Patient presents with    Chest Pain         HISTORY OF PRESENT ILLNESS    43-year-old male presenting to the ED with complaint of left-sided chest pain.  He reports he was drinking alcohol last night and complaining of chest pain today in which the ambulance was called.        History provided by:  Patient      Nursing Notes were reviewed.    PAST MEDICAL HISTORY     Past Medical History:   Diagnosis Date    Alcohol abuse, uncomplicated 01/04/2014    Alcohol abuse    Cannabis abuse, in remission     History of cannabis abuse    Encounter for general adult medical examination without abnormal findings 06/08/2015    Encounter for preventive health examination    Fracture of unspecified part of right clavicle, initial encounter for closed fracture 06/30/2014    Fracture of right clavicle    Keratoconus     Other psychoactive substance abuse, in remission (Multi)     History of phencyclidine abuse    Pain in right shoulder 05/03/2017    Chronic right shoulder pain    Personal history of nicotine dependence 05/27/2019    History of nicotine dependence    Personal history of nicotine dependence 05/27/2019    History of nicotine dependence    Personal history of other diseases of the circulatory system 07/18/2019    History of hypertension    Personal history of other endocrine, nutritional and metabolic disease 08/13/2021    History of hypokalemia         SURGICAL HISTORY     No past surgical history on file.      CURRENT MEDICATIONS       Previous Medications    ALBUTEROL 2.5 MG /3 ML (0.083 %) NEBULIZER SOLUTION    Inhale 3 mL (2.5 mg) every 6 hours if needed.    ALBUTEROL 90 MCG/ACTUATION INHALER    INHALE 1 TO 2 PUFFS BY MOUTH EVERY 4 TO 6 HOURS AS NEEDED    ALLOPURINOL (ZYLOPRIM) 300 MG TABLET    Take 1 tablet (300 mg) by  mouth once daily.    AMLODIPINE (NORVASC) 5 MG TABLET    Take 1 tablet (5 mg) by mouth once daily.    ATORVASTATIN (LIPITOR) 80 MG TABLET    Take 1 tablet (80 mg) by mouth once daily.    BENZALKONIUM CHLORIDE 0.13 % TOWELETTE    any kit covered by insurance, use as directed, dx: .9    CETIRIZINE (ZYRTEC) 10 MG TABLET    Take 1 tablet (10 mg) by mouth once daily.    CHOLECALCIFEROL (VITAMIN D-3) 50,000 UNIT CAPSULE    Take by mouth.    COMP-AIR NEBULIZER COMPRESSOR DEVICE        CYCLOBENZAPRINE (FLEXERIL) 10 MG TABLET    Take 1 tablet (10 mg) by mouth 3 times a day as needed for muscle spasms for up to 3 days.    ERGOCALCIFEROL (VITAMIN D-2) 1.25 MG (45478 UT) CAPSULE        FEROSUL TABLET        FLOVENT  MCG/ACTUATION INHALER    INHALE 2 PUFFS BY MOUTH TWICE A DAY    FOLIC ACID (FOLVITE) 1 MG TABLET    Take 1 tablet (1 mg) by mouth once daily.    GABAPENTIN (NEURONTIN) 300 MG CAPSULE    Take 1 capsule (300 mg) by mouth once daily.    HYDROCORTISONE 2.5 % CREAM    Apply topically 2 times a day as needed for irritation or rash.    HYDROXYZINE HCL (ATARAX) 25 MG TABLET    Take 1 tablet (25 mg) by mouth every 8 hours if needed.    INDOMETHACIN (INDOCIN) 50 MG CAPSULE    Take 1 capsule (50 mg) by mouth.    KETOCONAZOLE (NIZORAL) 2 % SHAMPOO    APPLY TOPICALLY 2 TIMES A WEEK. LEAVE ON FOR 5-10 MINUTES, THEN RINSE.    LORATADINE (CLARITIN) 10 MG TABLET    Take 1 tablet (10 mg) by mouth once daily.    LOSARTAN-HYDROCHLOROTHIAZIDE (HYZAAR) 50-12.5 MG TABLET    Take 1 tablet by mouth once daily.    METFORMIN (GLUCOPHAGE) 500 MG TABLET    Take by mouth.    METOPROLOL TARTRATE (LOPRESSOR) 50 MG TABLET    Take 1 tablet by mouth 2 times a day.    MULTIVITAMIN WITH MINERALS (THERA-M) TABLET    Take 1 tablet by mouth once daily.    NAPROXEN (NAPROSYN) 500 MG TABLET    Take by mouth every 12 hours.    OMEPRAZOLE (PRILOSEC) 40 MG DR CAPSULE    Take 1 tablet by mouth once daily.    POTASSIUM CHLORIDE CR 20 MEQ ER TABLET     Take 1 tablet (20 mEq) by mouth once daily.    TAB-A-KATLYN MULTIVITAMIN W-IRON 15 MG IRON- 400 MCG TABLET        TIZANIDINE (ZANAFLEX) 4 MG TABLET    TAKE 1 TABLET (4 MG) BY MOUTH EVERY 6 HOURS IF NEEDED FOR MUSCLE SPASMS.    TRAZODONE (DESYREL) 50 MG TABLET        TRIAMCINOLONE (KENALOG) 0.1 % CREAM    1 Application    WHITE PETROLATUM 61 % CREAM    Apply topically.       ALLERGIES     Patient has no known allergies.    FAMILY HISTORY       Family History   Problem Relation Name Age of Onset    Glaucoma Neg Hx      Macular degeneration Neg Hx            SOCIAL HISTORY       Social History     Socioeconomic History    Marital status: Single     Spouse name: Not on file    Number of children: Not on file    Years of education: Not on file    Highest education level: Not on file   Occupational History    Not on file   Tobacco Use    Smoking status: Some Days     Types: Cigarettes    Smokeless tobacco: Current   Substance and Sexual Activity    Alcohol use: Yes    Drug use: Never    Sexual activity: Not on file   Other Topics Concern    Not on file   Social History Narrative    Not on file     Social Determinants of Health     Financial Resource Strain: Low Risk  (11/27/2020)    Received from [facility]    Overall Financial Resource Strain (CARDIA)     Difficulty of Paying Living Expenses: Not hard at all   Food Insecurity: Unknown (4/11/2024)    Received from [facility]    Hunger Vital Sign     Worried About Running Out of Food in the Last Year: Never true     Ran Out of Food in the Last Year: Not on file   Transportation Needs: Unknown (11/27/2020)    Received from [facility]    PRAPARE - Transportation     Lack of Transportation (Medical): Not on file     Lack of Transportation (Non-Medical): No   Physical Activity: Not on File (11/13/2019)    Received from [facility]     Physical Activity     Physical Activity: 0   Stress: Not on File (11/13/2019)    Received from [facility]     Stress     Stress: 0    Social Connections: Not on File (2019)    Received from [facility]     Social Connections and Isolation: 0   Intimate Partner Violence: Not on file   Housing Stability: Not on File (2019)    Received from [email or web address]     Housing Stability     Housin       SCREENINGS                        PHYSICAL EXAM    (up to 7 for level 4, 8 or more for level 5)     ED Triage Vitals [05/10/24 1846]   Temperature Heart Rate Respirations BP   36.7 °C (98.1 °F) 100 15 (!) 170/93      Pulse Ox Temp Source Heart Rate Source Patient Position   95 % Oral Monitor --      BP Location FiO2 (%)     -- --       Physical Exam  Vitals and nursing note reviewed.   Constitutional:       General: He is not in acute distress.     Appearance: He is well-developed.   HENT:      Head: Normocephalic and atraumatic.      Right Ear: External ear normal.      Left Ear: External ear normal.      Nose: Nose normal. No congestion or rhinorrhea.      Mouth/Throat:      Mouth: Mucous membranes are moist.      Pharynx: No oropharyngeal exudate or posterior oropharyngeal erythema.   Eyes:      General:         Right eye: No discharge.         Left eye: No discharge.      Extraocular Movements: Extraocular movements intact.      Conjunctiva/sclera: Conjunctivae normal.   Cardiovascular:      Rate and Rhythm: Normal rate and regular rhythm.      Pulses: Normal pulses.   Pulmonary:      Effort: Pulmonary effort is normal. No respiratory distress.      Breath sounds: Normal breath sounds.   Abdominal:      General: There is no distension.      Palpations: Abdomen is soft.      Tenderness: There is no right CVA tenderness or left CVA tenderness.   Musculoskeletal:         General: No swelling, tenderness, deformity or signs of injury. Normal range of motion.   Skin:     General: Skin is warm and dry.      Coloration: Skin is not jaundiced or pale.      Findings: No lesion.   Neurological:      General: No focal deficit present.       Mental Status: He is alert and oriented to person, place, and time. Mental status is at baseline.      Sensory: No sensory deficit.      Motor: No weakness.   Psychiatric:         Mood and Affect: Mood normal.         Behavior: Behavior normal.         Thought Content: Thought content normal.         Judgment: Judgment normal.          DIAGNOSTIC RESULTS     LABS:  Labs Reviewed   TROPONIN SERIES- (INITIAL, 1 HR)    Narrative:     The following orders were created for panel order Troponin I Series, High Sensitivity (0, 1 HR).  Procedure                               Abnormality         Status                     ---------                               -----------         ------                     Troponin I, High Sensiti...[[id number]]                                                   Please view results for these tests on the individual orders.   CBC WITH AUTO DIFFERENTIAL   COMPREHENSIVE METABOLIC PANEL   MAGNESIUM   SERIAL TROPONIN-INITIAL       All other labs were within normal range or not returned as of this dictation.    Imaging  XR chest 1 view    (Results Pending)        Procedures  Procedures     EMERGENCY DEPARTMENT COURSE/MDM:     Diagnoses as of 05/10/24 1921   Chest pain, unspecified type        Medical Decision Making  43-year-old male presents to the ED with complaint of left-sided chest pain.  Patient is afebrile, hemodynamically stable on room air, and in no acute distress.  After IV access was obtained patient reports he is chest pain-free and wishes to return home.       I was called to the bedside and informed that the patient would like to leave AGAINST MEDICAL ADVICE at this time.  The patient is oriented to person, place, time, and demonstrating all key elements of capacity to make decisions regarding the medical care offered. He states he was drinking last night and in which EMS was called today and he was endorsing chest pain.  States that he no longer has chest pain and just wants to go  home.     The patient speaks coherently and exhibits no evidence of having an altered level of consciousness or alcohol or drug intoxication to a point that would impair ability to delineate a choice.  The patient demonstrates understanding and appreciation of the relevant information of the nature of their medical condition, as well as the risks, benefits, and treatment alternatives (including non-treatment), consequences of refusing care, and can appropriately communicate a rational reasoning about their choice of care options.  Patient is aware the suspected diagnosis suggested by history and exam. The patient demonstrates understanding and appreciation of the relevant information of the nature of their medical condition, as well as the risks, benefits, and treatment alternatives (including non-treatment), consequences of refusing care, and can appropriately communicate a rational reasoning about their choice of care options. The risks of refusing recommended care that were disclosed and acknowledged by the patient include death, neurologic dysfunction, permanent mental impairment, loss of limb, loss of current lifestyle, worsening of chronic condition, long-term disability. The patient understands they are welcome to return to the hospital at any time to receive the recommended care or any other care at any time, regardless of their ability to pay for such care. Discharge instructions were provided to the patient.          Patient and or family in agreement and understanding of treatment plan.  All questions answered.      I reviewed the case with the attending ED physician. The attending ED physician agrees with the plan. Patient and/or patient´s representative was counseled regarding labs, imaging, likely diagnosis, and plan. All questions were answered.    ED Medications administered this visit:  Medications - No data to display    New Prescriptions from this visit:    New Prescriptions    No medications on  file       Follow-up:  No follow-up provider specified.      Final Impression: No diagnosis found.      (Please note that portions of this note were completed with a voice recognition program.  Efforts were made to edit the dictations but occasionally words are mis-transcribed.)     Laron Bangura MD  Resident  05/10/24 3413

## 2024-05-10 NOTE — ED TRIAGE NOTES
Pt arrived to ED via EMS with c/o L cp that started 2-3 minutes ago as he was smoking a cigarette. Pt also endorses marijuana use and drinking alcohol today.

## 2024-05-10 NOTE — DISCHARGE INSTRUCTIONS
Seek immediate medical attention should you have:  fevers, shortness of breath, chest pain, weakness, confusion, vomiting, diarrhea, or any worsening or concerning symptoms.

## 2024-05-11 DIAGNOSIS — G89.29 CHRONIC PAIN OF RIGHT KNEE: ICD-10-CM

## 2024-05-11 DIAGNOSIS — M25.561 CHRONIC PAIN OF RIGHT KNEE: ICD-10-CM

## 2024-05-16 RX ORDER — TIZANIDINE 4 MG/1
4 TABLET ORAL EVERY 6 HOURS PRN
Qty: 30 TABLET | Refills: 2 | Status: SHIPPED | OUTPATIENT
Start: 2024-05-16

## 2024-06-17 DIAGNOSIS — G89.29 CHRONIC PAIN OF RIGHT KNEE: Primary | ICD-10-CM

## 2024-06-17 DIAGNOSIS — M25.561 CHRONIC PAIN OF RIGHT KNEE: Primary | ICD-10-CM

## 2024-06-17 RX ORDER — NAPROXEN 500 MG/1
500 TABLET ORAL EVERY 12 HOURS
Qty: 60 TABLET | Refills: 1 | Status: SHIPPED | OUTPATIENT
Start: 2024-06-17

## 2024-07-30 DIAGNOSIS — G89.29 CHRONIC PAIN OF RIGHT KNEE: ICD-10-CM

## 2024-07-30 DIAGNOSIS — M25.561 CHRONIC PAIN OF RIGHT KNEE: ICD-10-CM

## 2024-07-31 RX ORDER — TIZANIDINE 4 MG/1
4 TABLET ORAL EVERY 6 HOURS PRN
Qty: 30 TABLET | Refills: 2 | Status: SHIPPED | OUTPATIENT
Start: 2024-07-31

## 2024-08-23 ENCOUNTER — APPOINTMENT (OUTPATIENT)
Dept: PRIMARY CARE | Facility: CLINIC | Age: 44
End: 2024-08-23
Payer: COMMERCIAL

## 2024-08-23 VITALS
HEART RATE: 102 BPM | HEIGHT: 69 IN | RESPIRATION RATE: 16 BRPM | SYSTOLIC BLOOD PRESSURE: 173 MMHG | WEIGHT: 211.9 LBS | TEMPERATURE: 99.9 F | OXYGEN SATURATION: 98 % | DIASTOLIC BLOOD PRESSURE: 108 MMHG | BODY MASS INDEX: 31.39 KG/M2

## 2024-08-23 DIAGNOSIS — S76.312A STRAIN OF LEFT PIRIFORMIS MUSCLE, INITIAL ENCOUNTER: ICD-10-CM

## 2024-08-23 DIAGNOSIS — F10.10 ALCOHOL ABUSE: ICD-10-CM

## 2024-08-23 DIAGNOSIS — I10 ESSENTIAL HYPERTENSION: Primary | ICD-10-CM

## 2024-08-23 PROCEDURE — 3008F BODY MASS INDEX DOCD: CPT | Performed by: FAMILY MEDICINE

## 2024-08-23 PROCEDURE — 99214 OFFICE O/P EST MOD 30 MIN: CPT | Performed by: FAMILY MEDICINE

## 2024-08-23 PROCEDURE — 3080F DIAST BP >= 90 MM HG: CPT | Performed by: FAMILY MEDICINE

## 2024-08-23 PROCEDURE — 3077F SYST BP >= 140 MM HG: CPT | Performed by: FAMILY MEDICINE

## 2024-08-23 RX ORDER — INDOMETHACIN 50 MG/1
50 CAPSULE ORAL
Qty: 60 CAPSULE | Refills: 0 | Status: SHIPPED | OUTPATIENT
Start: 2024-08-23 | End: 2024-09-22

## 2024-08-23 RX ORDER — CHLORTHALIDONE 25 MG/1
25 TABLET ORAL DAILY
Qty: 30 TABLET | Refills: 11 | Status: SHIPPED | OUTPATIENT
Start: 2024-08-23 | End: 2025-08-23

## 2024-08-23 ASSESSMENT — COLUMBIA-SUICIDE SEVERITY RATING SCALE - C-SSRS
2. HAVE YOU ACTUALLY HAD ANY THOUGHTS OF KILLING YOURSELF?: NO
1. IN THE PAST MONTH, HAVE YOU WISHED YOU WERE DEAD OR WISHED YOU COULD GO TO SLEEP AND NOT WAKE UP?: NO
6. HAVE YOU EVER DONE ANYTHING, STARTED TO DO ANYTHING, OR PREPARED TO DO ANYTHING TO END YOUR LIFE?: NO

## 2024-08-23 ASSESSMENT — ENCOUNTER SYMPTOMS
OCCASIONAL FEELINGS OF UNSTEADINESS: 0
LOSS OF SENSATION IN FEET: 0
CONSTITUTIONAL NEGATIVE: 1
DEPRESSION: 0
MYALGIAS: 1

## 2024-08-23 ASSESSMENT — PATIENT HEALTH QUESTIONNAIRE - PHQ9
SUM OF ALL RESPONSES TO PHQ9 QUESTIONS 1 AND 2: 0
1. LITTLE INTEREST OR PLEASURE IN DOING THINGS: NOT AT ALL
2. FEELING DOWN, DEPRESSED OR HOPELESS: NOT AT ALL

## 2024-08-23 ASSESSMENT — PAIN SCALES - GENERAL: PAINLEVEL: 0-NO PAIN

## 2024-08-23 NOTE — PROGRESS NOTES
"Subjective   Patient ID: Diego French is a 44 y.o. male who presents for Establish Care (npv) and Med Refill (Muscle relaxer, ).    Diego is here to establish care. He has a history of refractory hypertension. He doesn't take his losartan-hydrochlorothiazide but does take his amlodipine. He complains of excessive fatigue with losartan-hydrochlorothiazide. Unfortunately he is a heavy drinker. He is considering rehabilitation. I informed him that this makes his blood pressure extremely difficult to treat.    Diego complains of pain in upper inner thigh, especially on his left side. He doesn't recall any specific injury. He works as a . No pain elsewhere. Has tried naprosyn without success. Pain is crampy and worse with walking and other movements.     Med Refill  Associated symptoms include myalgias.        Review of Systems   Constitutional: Negative.    Musculoskeletal:  Positive for myalgias.       Objective   BP (!) 173/108 (BP Location: Right arm, Patient Position: Sitting, BP Cuff Size: Adult)   Pulse 102   Temp 37.7 °C (99.9 °F) (Temporal)   Resp 16   Ht 1.753 m (5' 9\")   Wt 96.1 kg (211 lb 14.4 oz)   SpO2 98%   BMI 31.29 kg/m²     Physical Exam  Constitutional:       Appearance: Normal appearance.   Cardiovascular:      Rate and Rhythm: Normal rate and regular rhythm.   Pulmonary:      Effort: Pulmonary effort is normal.      Breath sounds: Normal breath sounds.   Musculoskeletal:      Comments: He has slight tenderness in his left piriformis area. No other tenderness. Normal ROM of hips.    Psychiatric:         Mood and Affect: Mood normal.      Comments: Speech is very difficult to decipher at times.          Assessment/Plan : Musculoskeletal/piriformis strain. Trial of indomethacin for up to 7 days. Switched his losartan-hydrochlorothiazide to chlrothalidone. F/U relatively soon, within 4 weeks. Encouraged him to quit drinking and to seek help to do so.          "

## 2024-09-23 ENCOUNTER — APPOINTMENT (OUTPATIENT)
Dept: PRIMARY CARE | Facility: CLINIC | Age: 44
End: 2024-09-23
Payer: COMMERCIAL

## 2024-09-23 VITALS
WEIGHT: 214 LBS | SYSTOLIC BLOOD PRESSURE: 203 MMHG | OXYGEN SATURATION: 99 % | HEART RATE: 100 BPM | HEIGHT: 70 IN | RESPIRATION RATE: 16 BRPM | DIASTOLIC BLOOD PRESSURE: 138 MMHG | TEMPERATURE: 98.7 F | BODY MASS INDEX: 30.64 KG/M2

## 2024-09-23 DIAGNOSIS — F10.10 ALCOHOL ABUSE: Primary | ICD-10-CM

## 2024-09-23 PROCEDURE — 99213 OFFICE O/P EST LOW 20 MIN: CPT | Performed by: FAMILY MEDICINE

## 2024-09-23 PROCEDURE — 3008F BODY MASS INDEX DOCD: CPT | Performed by: FAMILY MEDICINE

## 2024-09-23 ASSESSMENT — ENCOUNTER SYMPTOMS
CONSTITUTIONAL NEGATIVE: 1
DEPRESSION: 0
LOSS OF SENSATION IN FEET: 0
OCCASIONAL FEELINGS OF UNSTEADINESS: 0

## 2024-09-23 ASSESSMENT — PATIENT HEALTH QUESTIONNAIRE - PHQ9
SUM OF ALL RESPONSES TO PHQ9 QUESTIONS 1 AND 2: 0
2. FEELING DOWN, DEPRESSED OR HOPELESS: NOT AT ALL
1. LITTLE INTEREST OR PLEASURE IN DOING THINGS: NOT AT ALL

## 2024-09-23 ASSESSMENT — PAIN SCALES - GENERAL: PAINLEVEL: 0-NO PAIN

## 2024-09-23 NOTE — PROGRESS NOTES
"Subjective   Patient ID: Diego French is a 44 y.o. male who presents for Follow-up.    Returns for uncontrolled hypertension. The new regiment doesn't appear to be working. He still complains of area in his piriformis region. He states he is taking his chlorthalidone as prescribed. What emerged shortly afterwards is that he is a very heavy drinker, who consumes large amounts of hard liquor, including yesterday. He has in the past had significant alcohol withdrawal symptoms as well. He reports a variety of symptoms in addition to pain in his buttocks, including abdominal bloating and occasional pain. He appears to have significant communication difficulties and his speech is hard to understand, though he isn't intoxicated at present.          Review of Systems   Constitutional: Negative.        Objective   BP (!) 203/138 (BP Location: Right arm, Patient Position: Sitting, BP Cuff Size: Adult)   Pulse 100   Temp 37.1 °C (98.7 °F) (Temporal)   Resp 16   Ht 1.778 m (5' 10\")   Wt 97.1 kg (214 lb)   SpO2 99%   BMI 30.71 kg/m²     Physical Exam  Constitutional:       Appearance: He is not ill-appearing.   Cardiovascular:      Pulses: Normal pulses.      Heart sounds: Normal heart sounds.   Pulmonary:      Effort: Pulmonary effort is normal.      Breath sounds: Normal breath sounds.         Assessment/Plan :  Diego needs to stop drinking. I don't believe his BP can be controlled otherwise. I asked him to continue chlorthalidone, but referred him to a substance abuse counselor here at . I asked him to return in roughly 1 month to see if he has made progress. I'm hopeful he will feel better overall, including resolution of his pain by that point.          "

## 2024-10-08 ENCOUNTER — TELEPHONE (OUTPATIENT)
Dept: PRIMARY CARE | Facility: CLINIC | Age: 44
End: 2024-10-08
Payer: COMMERCIAL

## 2024-10-08 ENCOUNTER — PATIENT OUTREACH (OUTPATIENT)
Dept: PRIMARY CARE | Facility: CLINIC | Age: 44
End: 2024-10-08
Payer: COMMERCIAL

## 2024-10-08 ENCOUNTER — DOCUMENTATION (OUTPATIENT)
Dept: PRIMARY CARE | Facility: CLINIC | Age: 44
End: 2024-10-08
Payer: COMMERCIAL

## 2024-10-08 DIAGNOSIS — M79.606 PAIN OF LOWER EXTREMITY, UNSPECIFIED LATERALITY: Primary | ICD-10-CM

## 2024-10-08 PROBLEM — Z79.1 NSAID LONG-TERM USE: Status: ACTIVE | Noted: 2024-10-08

## 2024-10-08 PROBLEM — R79.89 ABNORMAL LIVER FUNCTION TEST: Status: ACTIVE | Noted: 2024-10-08

## 2024-10-08 PROBLEM — F10.939 ALCOHOL WITHDRAWAL (MULTI): Status: ACTIVE | Noted: 2024-10-08

## 2024-10-08 RX ORDER — ACETAMINOPHEN 325 MG/1
650 TABLET ORAL EVERY 6 HOURS PRN
Qty: 30 TABLET | Refills: 2 | Status: SHIPPED | OUTPATIENT
Start: 2024-10-08 | End: 2024-10-19

## 2024-10-08 NOTE — TELEPHONE ENCOUNTER
RN was alerted by  staff after they spoke to the patient about pain medicine and had concerns about alcohol withdrawal. RN reached out to patient. Patient stated he is trying to slow down and cut back, his last drink was around 9pm last night. RN educated patient on withdrawal symptoms to look out for and when to seek emergency care. Patient verbalized understanding.

## 2024-10-08 NOTE — PROGRESS NOTES
"Telephone call to our office from Mr. French, requesting a refill of naproxen for chronic pain in \"hind leg\".  He takes 500 mg twice a day recently.  He has occasional abdominal pain, not bleeding..  Review of Dr. Velazquez's recent note shows that Mr. French had uncontrolled high blood pressure and heavy drinking.  Review of 4/24 ED record and lab results from 12/23 show that he was seen for alcohol and PCP intoxication, had mildly elevate GGT and normal bilirubin, EGFR 70.   Normal CBC.  In 12/23, GGT was elevated 224.      I informed Mr. French that uncontrolled high blood pressure, evidence of liver problem, heavy alcohol use, and abdominal pains are reasons not to use naproxen.  He said, \"Yeah, that's why I quit taking ibuprofen.\"   He asked to be seen for chronic leg pain, sooner than next available with his PCP.  I will ask  to see if there is another appointment for him.   WE discussed limited use of acetaminophen, and he requested a prescription for that.  He is aware that he cannot exceed 3 grams a day, in divided doses.    Then he told me he has stopped drinking within the past few days.  Now he is experiencing \"the shakes\", itching all over, and feels agitated.  I advised him to seek medical help immediately for safe detox and treatment of alcohol withdrawal.  He said he'd been calling his \"shar\" to take him to the hospital, but getting now answer.  I asked him to call 911 now.  I also gave him the phone number of [facility], which appears to offer detox:  [phone].  He appears to live near the [facility], so I am not sure which is the closest hospital to which he will be transported by emergency services.        "

## 2024-10-31 DIAGNOSIS — M25.561 CHRONIC PAIN OF RIGHT KNEE: ICD-10-CM

## 2024-10-31 DIAGNOSIS — G89.29 CHRONIC PAIN OF RIGHT KNEE: ICD-10-CM

## 2024-11-01 RX ORDER — TIZANIDINE 4 MG/1
4 TABLET ORAL EVERY 6 HOURS PRN
Qty: 30 TABLET | Refills: 2 | Status: SHIPPED | OUTPATIENT
Start: 2024-11-01

## 2024-11-04 ENCOUNTER — APPOINTMENT (OUTPATIENT)
Dept: PRIMARY CARE | Facility: CLINIC | Age: 44
End: 2024-11-04
Payer: COMMERCIAL

## 2024-11-15 ENCOUNTER — APPOINTMENT (OUTPATIENT)
Dept: PRIMARY CARE | Facility: CLINIC | Age: 44
End: 2024-11-15
Payer: COMMERCIAL

## 2024-11-15 VITALS
HEIGHT: 71 IN | SYSTOLIC BLOOD PRESSURE: 134 MMHG | DIASTOLIC BLOOD PRESSURE: 91 MMHG | HEART RATE: 115 BPM | WEIGHT: 219 LBS | BODY MASS INDEX: 30.66 KG/M2 | TEMPERATURE: 97 F | OXYGEN SATURATION: 95 %

## 2024-11-15 DIAGNOSIS — Z00.00 HEALTHCARE MAINTENANCE: ICD-10-CM

## 2024-11-15 DIAGNOSIS — M25.561 CHRONIC PAIN OF RIGHT KNEE: ICD-10-CM

## 2024-11-15 DIAGNOSIS — M65.4 DE QUERVAIN'S TENOSYNOVITIS, LEFT: ICD-10-CM

## 2024-11-15 DIAGNOSIS — M54.32 SCIATICA OF LEFT SIDE: Primary | ICD-10-CM

## 2024-11-15 DIAGNOSIS — E11.9 TYPE 2 DIABETES MELLITUS WITHOUT COMPLICATION, WITHOUT LONG-TERM CURRENT USE OF INSULIN (MULTI): ICD-10-CM

## 2024-11-15 DIAGNOSIS — G89.29 CHRONIC PAIN OF RIGHT KNEE: ICD-10-CM

## 2024-11-15 RX ORDER — ACETAMINOPHEN 325 MG/1
TABLET ORAL EVERY 6 HOURS PRN
COMMUNITY
Start: 2015-07-24

## 2024-11-15 RX ORDER — TRAMADOL HYDROCHLORIDE 50 MG/1
TABLET ORAL EVERY 6 HOURS PRN
COMMUNITY
Start: 2013-10-17

## 2024-11-15 RX ORDER — COLCHICINE 0.6 MG/1
TABLET ORAL
COMMUNITY
Start: 2024-03-16

## 2024-11-15 RX ORDER — NAPROXEN 500 MG/1
500 TABLET ORAL EVERY 12 HOURS
Qty: 60 TABLET | Refills: 1 | Status: SHIPPED | OUTPATIENT
Start: 2024-11-15

## 2024-11-15 RX ORDER — TIZANIDINE 4 MG/1
4 TABLET ORAL EVERY 6 HOURS PRN
Qty: 30 TABLET | Refills: 2 | Status: SHIPPED | OUTPATIENT
Start: 2024-11-15

## 2024-11-15 ASSESSMENT — PAIN SCALES - GENERAL: PAINLEVEL_OUTOF10: 10-WORST PAIN EVER

## 2024-11-15 NOTE — PROGRESS NOTES
"Subjective   Patient ID:   Diego French is a 44 y.o. male who presents for Wrist Pain and Groin Pain (Pt has stomach pain and other issue. ).  HPI    #Left wrist pain  - Started 3 days ago  - Denied any trauma or strenuous exercise  - Has tried Tylenol which didn't help and naproxen only helps a little  - Reports he only takes naproxen once per day  - Has not tried ice pack or any braces  - Reports he is currently on disability but does chores around the house  - Pain is constant and stays in the wrist  - Reports some swelling  - Denied any weakness or numbness, or tingling    #Left sided buttock pain  - Reports it has been happening for months  - Reports pain is constant and is located in his left buttocks   - Reports the pain radiates down leg to just above his knee  - Uses a muscle relaxer which helps with the pain and needs refills on it  - Has also used tylenol which has not helped  - Has never tried PT for it  - Reports it has made walking, sleeping, and sitting difficult    #HM  - Would like to have blood work done for a general check up    Review of Systems   Musculoskeletal:  Positive for arthralgias and joint swelling.       Objective   BP (!) 134/91 (BP Location: Right arm, Patient Position: Sitting)   Pulse (!) 115   Temp 36.1 °C (97 °F) (Temporal)   Ht 1.803 m (5' 11\")   Wt 99.3 kg (219 lb)   SpO2 95%   BMI 30.54 kg/m²    Physical Exam  Constitutional:       General: He is not in acute distress.     Appearance: Normal appearance. He is not ill-appearing.   HENT:      Head: Normocephalic and atraumatic.   Eyes:      Extraocular Movements: Extraocular movements intact.   Cardiovascular:      Rate and Rhythm: Normal rate and regular rhythm.      Heart sounds: Normal heart sounds. No murmur heard.     No friction rub. No gallop.   Pulmonary:      Effort: Pulmonary effort is normal. No respiratory distress.      Breath sounds: Normal breath sounds. No stridor. No wheezing, rhonchi or rales. "   Abdominal:      General: There is no distension.      Palpations: Abdomen is soft. There is no mass.      Tenderness: There is no abdominal tenderness. There is no guarding.      Hernia: No hernia is present.   Musculoskeletal:         General: Swelling and tenderness present. Normal range of motion.      Cervical back: Normal range of motion.      Right lower leg: No edema.      Left lower leg: No edema.      Comments: Mild swelling and tenderness of the left wrist. Positive Finkelstein test of left wrist. Positive straight leg raise of left leg   Neurological:      Mental Status: He is alert.   Psychiatric:         Mood and Affect: Mood normal.         Behavior: Behavior normal.         Assessment/Plan     Problem List Items Addressed This Visit    None  Diego French is a44 year old male who presents to the clinic for left wrist and buttocks pain.    #Left De Quervain Tenosynovitis  - Positive Finkelstein test on left side consistent with De quervain tenosynovitis  - Referral to OT placed  - Consider Thumb spica splint if indicated by OT  - c/w Naproxen PRN for pain. Refilled Naproxen 500 mg BID and advised patient he can take it BID as needed  - Encouraged to used ice PRN  - Declined Voltaren gel and lidocaine patch    # Left sided Sciatica  - Positive straight leg test on left side consistent with sciatica  - Referral to PT placed  - Refilled Tizanidine 4 mg Q6h PRN  - c/w Naproxen 500 mg BID PRN    #  - Ordered CMP, Hgb A1c, & Lipid panel  - Declined Flu shot    RTC in 3 months for a HM visit.    The patient was discussed with Dr. Ordonez.    Keyon Mcneill MD  Family Medicine   PGY-3

## 2024-11-16 ASSESSMENT — ENCOUNTER SYMPTOMS
JOINT SWELLING: 1
ARTHRALGIAS: 1

## 2024-11-22 NOTE — PROGRESS NOTES
I reviewed the resident/fellow's documentation and discussed the patient with the resident/fellow. I agree with the resident/fellow's medical decision making as documented in the note. I discussed but did not see the patient consistent with the Primary Care Exception.       Divine Ordonez MD

## 2024-11-29 DIAGNOSIS — B35.0 TINEA BARBAE: ICD-10-CM

## 2024-12-02 RX ORDER — HYDROCORTISONE 25 MG/G
CREAM TOPICAL 2 TIMES DAILY PRN
Qty: 30 G | Refills: 11 | Status: SHIPPED | OUTPATIENT
Start: 2024-12-02 | End: 2025-12-02

## 2024-12-30 ENCOUNTER — PATIENT OUTREACH (OUTPATIENT)
Dept: CARE COORDINATION | Facility: CLINIC | Age: 44
End: 2024-12-30
Payer: COMMERCIAL

## 2024-12-30 NOTE — PROGRESS NOTES
Chart review complete for CM.  Adm facility: [facility]  Admission date: 12/19/24 to present  Hospital Encounter and Summary: Linked    Outreach call to patient to support a smooth transition of care from recent admission. Unable to reach patient at listed contact number.    Jordyn Hirsch RN, [facility]  Phone [phone]

## 2025-01-21 ENCOUNTER — APPOINTMENT (OUTPATIENT)
Dept: PRIMARY CARE | Facility: CLINIC | Age: 45
End: 2025-01-21
Payer: COMMERCIAL

## 2025-01-28 DIAGNOSIS — G89.29 CHRONIC PAIN OF RIGHT KNEE: ICD-10-CM

## 2025-01-28 DIAGNOSIS — M25.561 CHRONIC PAIN OF RIGHT KNEE: ICD-10-CM

## 2025-01-28 DIAGNOSIS — I10 PRIMARY HYPERTENSION: ICD-10-CM

## 2025-01-28 DIAGNOSIS — M1A.9XX0 CHRONIC GOUT INVOLVING TOE OF RIGHT FOOT WITHOUT TOPHUS, UNSPECIFIED CAUSE: ICD-10-CM

## 2025-01-28 RX ORDER — TIZANIDINE 4 MG/1
4 TABLET ORAL EVERY 6 HOURS PRN
Qty: 30 TABLET | Refills: 1 | Status: SHIPPED | OUTPATIENT
Start: 2025-01-28

## 2025-01-28 RX ORDER — AMLODIPINE BESYLATE 5 MG/1
5 TABLET ORAL
Qty: 30 TABLET | Refills: 1 | Status: SHIPPED | OUTPATIENT
Start: 2025-01-28 | End: 2025-02-27

## 2025-01-28 RX ORDER — LOSARTAN POTASSIUM AND HYDROCHLOROTHIAZIDE 12.5; 5 MG/1; MG/1
1 TABLET ORAL
Qty: 30 TABLET | Refills: 1 | Status: SHIPPED | OUTPATIENT
Start: 2025-01-28

## 2025-01-28 RX ORDER — ALLOPURINOL 300 MG/1
300 TABLET ORAL
Qty: 30 TABLET | Refills: 1 | Status: SHIPPED | OUTPATIENT
Start: 2025-01-28

## 2025-01-28 RX ORDER — NAPROXEN 500 MG/1
500 TABLET ORAL EVERY 12 HOURS
Qty: 60 TABLET | Refills: 1 | Status: SHIPPED | OUTPATIENT
Start: 2025-01-28

## 2025-02-25 DIAGNOSIS — J45.909 ASTHMA, UNSPECIFIED ASTHMA SEVERITY, UNSPECIFIED WHETHER COMPLICATED, UNSPECIFIED WHETHER PERSISTENT (HHS-HCC): ICD-10-CM

## 2025-02-25 RX ORDER — FLUTICASONE PROPIONATE 110 UG/1
2 AEROSOL, METERED RESPIRATORY (INHALATION) 2 TIMES DAILY
Qty: 36 G | Refills: 3 | Status: SHIPPED | OUTPATIENT
Start: 2025-02-25

## 2025-03-04 ENCOUNTER — APPOINTMENT (OUTPATIENT)
Facility: HOSPITAL | Age: 45
End: 2025-03-04
Payer: COMMERCIAL

## 2025-03-04 VITALS
HEIGHT: 70 IN | WEIGHT: 213 LBS | OXYGEN SATURATION: 97 % | TEMPERATURE: 96.7 F | SYSTOLIC BLOOD PRESSURE: 177 MMHG | BODY MASS INDEX: 30.49 KG/M2 | HEART RATE: 116 BPM | DIASTOLIC BLOOD PRESSURE: 108 MMHG

## 2025-03-04 DIAGNOSIS — R55 SYNCOPE, UNSPECIFIED SYNCOPE TYPE: ICD-10-CM

## 2025-03-04 DIAGNOSIS — R12 HEARTBURN: ICD-10-CM

## 2025-03-04 DIAGNOSIS — I10 PRIMARY HYPERTENSION: Primary | ICD-10-CM

## 2025-03-04 DIAGNOSIS — R07.9 LEFT-SIDED CHEST PAIN: ICD-10-CM

## 2025-03-04 RX ORDER — FAMOTIDINE 20 MG/1
20 TABLET, FILM COATED ORAL 2 TIMES DAILY
Qty: 60 TABLET | Refills: 5 | Status: SHIPPED | OUTPATIENT
Start: 2025-03-04 | End: 2025-08-31

## 2025-03-04 RX ORDER — AMLODIPINE BESYLATE 5 MG/1
5 TABLET ORAL
Qty: 90 TABLET | Refills: 1 | Status: SHIPPED | OUTPATIENT
Start: 2025-03-04 | End: 2025-08-31

## 2025-03-04 RX ORDER — LOSARTAN POTASSIUM AND HYDROCHLOROTHIAZIDE 12.5; 5 MG/1; MG/1
1 TABLET ORAL
Qty: 90 TABLET | Refills: 1 | Status: SHIPPED | OUTPATIENT
Start: 2025-03-04

## 2025-03-04 ASSESSMENT — ENCOUNTER SYMPTOMS
LIGHT-HEADEDNESS: 1
HEADACHES: 1
DIZZINESS: 1

## 2025-03-04 ASSESSMENT — PAIN SCALES - GENERAL: PAINLEVEL_OUTOF10: 10-WORST PAIN EVER

## 2025-03-04 NOTE — PROGRESS NOTES
"Subjective   Patient ID:   Diego French is a 44 y.o. male who presents for Follow-up, Headache, Chest Pain, and gluteux aleksandr pain.  HPI  #HTN  - Does not have a BP cuff at home and has not been able to check  - Reported he stopped taking his medications about a month ago  - Reported he stopped due to long history of dizziness and fainting which has been going on for years  - Reports he still has symptoms even after stopping his medications so plans to resume his medications  - Endorses headaches, seeing spots, and dizziness    #Left sided Chest pain  - Started about a month ago  - Pain is located on the left side of his chest  - Only notices the pain after eating  - Not associated with exertion  - Reports he thinks it is due to gas  - Has not tried anything for it  - Reports he also lays down after eating  - Not associated with any certain foods  - Episodes last for 30 min  - Denied any radiation of the pain but endorses sweating and SOB    #Fainting  - Has been occurring for years  - happens very rarely about 2-3 times per year  - Stopped driving due to having an episode while driving in the past  - Reported he fell down the stairs during an episode in the past  - Reports he has observed episodes which lasted seconds  - He reports he is sometimes confused after episodes      Review of Systems   Eyes:  Positive for visual disturbance.   Cardiovascular:  Positive for chest pain.   Neurological:  Positive for dizziness, syncope, light-headedness and headaches.       Objective   BP (!) 177/108 (BP Location: Right arm, Patient Position: Sitting)   Pulse (!) 116   Temp 35.9 °C (96.7 °F) (Temporal)   Ht 1.778 m (5' 10\")   Wt 96.6 kg (213 lb)   SpO2 97%   BMI 30.56 kg/m²    Physical Exam  Constitutional:       General: He is not in acute distress.     Appearance: Normal appearance. He is obese. He is not ill-appearing.   HENT:      Head: Normocephalic and atraumatic.   Eyes:      Extraocular Movements: Extraocular " movements intact.      Conjunctiva/sclera: Conjunctivae normal.   Cardiovascular:      Rate and Rhythm: Regular rhythm. Tachycardia present.      Heart sounds: Normal heart sounds. No murmur heard.     No friction rub. No gallop.   Pulmonary:      Effort: Pulmonary effort is normal. No respiratory distress.      Breath sounds: Normal breath sounds. No wheezing.   Musculoskeletal:         General: Normal range of motion.      Cervical back: Normal range of motion.      Right lower leg: No edema.      Left lower leg: No edema.   Neurological:      Mental Status: He is alert.   Psychiatric:         Mood and Affect: Mood normal.         Behavior: Behavior normal.       Assessment/Plan     Problem List Items Addressed This Visit    None  Diego French is a 44 year old male with a PMHx of HTN, gout, who presents to the clinic for a follow up.    #Uncontrolled HTN   - IO BP  177/108, not at goal likely in setting of medication noncompliance  - Ordered BP cuff and encouraged to check BP regularly at home and given a log to record his readings to bring to his next appointment  - Resume Amlodipine 5 mg every day & Losartan-hydrochlorothiazide 50-12.5 mg every day  - Refills sent to pharmacy on file     #Left sided chest pain  #Syncopal episodes  #GERD  - Given his history, his chest pain could be due to reflux vs angina with eating but less likely given he does not get similar symptoms after exercising  - Ordered Famotidine 20 mg BID   - EKG from 5/2024 showed NSR with some PVCs  - Ordered Holter monitor to rule out arrhythmias  - Ordered stress echo  - Encouraged patient to have previously ordered labs drawn including CMP, lipid panel, and Hgb A1c  - Ordered urine albumin    RTC in 2 weeks for a BP check       The patient was discussed with Dr. Ayala.    Keyon Mcneill MD  Family Medicine   PGY-3

## 2025-03-17 ENCOUNTER — APPOINTMENT (OUTPATIENT)
Facility: HOSPITAL | Age: 45
End: 2025-03-17
Payer: COMMERCIAL

## 2025-03-25 DIAGNOSIS — G89.29 CHRONIC PAIN OF RIGHT KNEE: ICD-10-CM

## 2025-03-25 DIAGNOSIS — M25.561 CHRONIC PAIN OF RIGHT KNEE: ICD-10-CM

## 2025-03-25 DIAGNOSIS — M1A.9XX0 CHRONIC GOUT INVOLVING TOE OF RIGHT FOOT WITHOUT TOPHUS, UNSPECIFIED CAUSE: ICD-10-CM

## 2025-03-26 LAB
ALBUMIN SERPL-MCNC: 3.7 G/DL (ref 3.6–5.1)
ALP SERPL-CCNC: 75 U/L (ref 36–130)
ALT SERPL-CCNC: 13 U/L (ref 9–46)
ANION GAP SERPL CALCULATED.4IONS-SCNC: 10 MMOL/L (CALC) (ref 7–17)
AST SERPL-CCNC: 24 U/L (ref 10–40)
BILIRUB SERPL-MCNC: 0.4 MG/DL (ref 0.2–1.2)
BUN SERPL-MCNC: 15 MG/DL (ref 7–25)
CALCIUM SERPL-MCNC: 8.7 MG/DL (ref 8.6–10.3)
CHLORIDE SERPL-SCNC: 98 MMOL/L (ref 98–110)
CHOLEST SERPL-MCNC: 266 MG/DL
CHOLEST/HDLC SERPL: 4.4 (CALC)
CO2 SERPL-SCNC: 34 MMOL/L (ref 20–32)
CREAT SERPL-MCNC: 1.18 MG/DL (ref 0.6–1.29)
EGFRCR SERPLBLD CKD-EPI 2021: 77 ML/MIN/1.73M2
EST. AVERAGE GLUCOSE BLD GHB EST-MCNC: 123 MG/DL
EST. AVERAGE GLUCOSE BLD GHB EST-SCNC: 6.8 MMOL/L
GLUCOSE SERPL-MCNC: 99 MG/DL (ref 65–139)
HBA1C MFR BLD: 5.9 % OF TOTAL HGB
HDLC SERPL-MCNC: 60 MG/DL
LDLC SERPL CALC-MCNC: ABNORMAL MG/DL
NONHDLC SERPL-MCNC: 206 MG/DL (CALC)
POTASSIUM SERPL-SCNC: 3.5 MMOL/L (ref 3.5–5.3)
PROT SERPL-MCNC: 7.5 G/DL (ref 6.1–8.1)
SODIUM SERPL-SCNC: 142 MMOL/L (ref 135–146)
TRIGL SERPL-MCNC: 441 MG/DL

## 2025-03-28 ENCOUNTER — TELEPHONE (OUTPATIENT)
Facility: HOSPITAL | Age: 45
End: 2025-03-28
Payer: COMMERCIAL

## 2025-03-28 DIAGNOSIS — E78.2 MIXED HYPERLIPIDEMIA: Primary | ICD-10-CM

## 2025-03-28 RX ORDER — ATORVASTATIN CALCIUM 80 MG/1
80 TABLET, FILM COATED ORAL DAILY
Qty: 90 TABLET | Refills: 3 | Status: SHIPPED | OUTPATIENT
Start: 2025-03-28

## 2025-03-28 RX ORDER — ALLOPURINOL 300 MG/1
300 TABLET ORAL
Qty: 30 TABLET | Refills: 2 | Status: SHIPPED | OUTPATIENT
Start: 2025-03-28

## 2025-03-28 RX ORDER — NAPROXEN 500 MG/1
500 TABLET ORAL EVERY 12 HOURS
Qty: 60 TABLET | Refills: 2 | Status: SHIPPED | OUTPATIENT
Start: 2025-03-28

## 2025-03-28 NOTE — TELEPHONE ENCOUNTER
Result Communication    Resulted Orders   Lipid Panel   Result Value Ref Range    CHOLESTEROL, TOTAL 266 (H) <200 mg/dL    HDL CHOLESTEROL 60 > OR = 40 mg/dL    TRIGLYCERIDES 441 (H) <150 mg/dL      Comment:         If a non-fasting specimen was collected, consider  repeat triglyceride testing on a fasting specimen  if clinically indicated.   Jany et al. J. of Clin. Lipidol. 2015;9:129-169.         LDL-CHOLESTEROL        Comment:         LDL cholesterol not calculated. Triglyceride levels  greater than 400 mg/dL invalidate calculated LDL results.           Reference range: <100     Desirable range <100 mg/dL for primary prevention;    <70 mg/dL for patients with CHD or diabetic patients   with > or = 2 CHD risk factors.     LDL-C is now calculated using the Carolyn   calculation, which is a validated novel method providing   better accuracy than the Friedewald equation in the   estimation of LDL-C.   Roland MILIAN et al. HARSH. 2013;310(19): 8606-1478   (http://education.[employer].Accessbio/faq/WHQ423)      CHOL/HDLC RATIO 4.4 <5.0 (calc)    NON HDL CHOLESTEROL 206 (H) <130 mg/dL (calc)      Comment:      For patients with diabetes plus 1 major ASCVD risk   factor, treating to a non-HDL-C goal of <100 mg/dL   (LDL-C of <70 mg/dL) is considered a therapeutic   option.      Narrative    FASTING:NO    FASTING: NO   Comprehensive Metabolic Panel   Result Value Ref Range    GLUCOSE 99 65 - 139 mg/dL      Comment:                Non-fasting reference interval         UREA NITROGEN (BUN) 15 7 - 25 mg/dL    CREATININE 1.18 0.60 - 1.29 mg/dL    EGFR 77 > OR = 60 mL/min/1.73m2    SODIUM 142 135 - 146 mmol/L    POTASSIUM 3.5 3.5 - 5.3 mmol/L    CHLORIDE 98 98 - 110 mmol/L    CARBON DIOXIDE 34 (H) 20 - 32 mmol/L    ELECTROLYTE BALANCE 10 7 - 17 mmol/L (calc)    CALCIUM 8.7 8.6 - 10.3 mg/dL    PROTEIN, TOTAL 7.5 6.1 - 8.1 g/dL    ALBUMIN 3.7 3.6 - 5.1 g/dL    BILIRUBIN, TOTAL 0.4 0.2 - 1.2 mg/dL    ALKALINE PHOSPHATASE 75  36 - 130 U/L    AST 24 10 - 40 U/L    ALT 13 9 - 46 U/L    Narrative    FASTING:NO    FASTING: NO   Hemoglobin A1C   Result Value Ref Range    HEMOGLOBIN A1c 5.9 (H) <5.7 % of total Hgb      Comment:      For someone without known diabetes, a hemoglobin   A1c value between 5.7% and 6.4% is consistent with  prediabetes and should be confirmed with a   follow-up test.     For someone with known diabetes, a value <7%  indicates that their diabetes is well controlled. A1c  targets should be individualized based on duration of  diabetes, age, comorbid conditions, and other  considerations.     This assay result is consistent with an increased risk  of diabetes.     Currently, no consensus exists regarding use of  hemoglobin A1c for diagnosis of diabetes for children.         eAG (mg/dL) 123 mg/dL    eAG (mmol/L) 6.8 mmol/L    Narrative    FASTING:NO    FASTING: NO       9:57 AM      Results were successfully communicated with the patient and they acknowledged their understanding. Discussed lifestyle modifications to help lower A1c and lipids. Discussed continuing atorvastatin but patient reports he does not have that medication. Refill sent to pharmacy on file.

## 2025-03-28 NOTE — TELEPHONE ENCOUNTER
----- Message from Agusto Dexter sent at 3/26/2025  1:11 PM EDT -----    ----- Message -----  From: Rickie Desk Results In  Sent: 3/26/2025   5:44 AM EDT  To: Divine Ordonez MD

## 2025-04-08 ENCOUNTER — OFFICE VISIT (OUTPATIENT)
Facility: HOSPITAL | Age: 45
End: 2025-04-08
Payer: COMMERCIAL

## 2025-04-08 VITALS
BODY MASS INDEX: 29.75 KG/M2 | OXYGEN SATURATION: 98 % | WEIGHT: 207.8 LBS | HEIGHT: 70 IN | RESPIRATION RATE: 18 BRPM | SYSTOLIC BLOOD PRESSURE: 166 MMHG | DIASTOLIC BLOOD PRESSURE: 104 MMHG | HEART RATE: 100 BPM | TEMPERATURE: 97.9 F

## 2025-04-08 DIAGNOSIS — L85.3 DRY SKIN: ICD-10-CM

## 2025-04-08 DIAGNOSIS — I10 PRIMARY HYPERTENSION: Primary | ICD-10-CM

## 2025-04-08 PROCEDURE — 99214 OFFICE O/P EST MOD 30 MIN: CPT

## 2025-04-08 PROCEDURE — 3077F SYST BP >= 140 MM HG: CPT

## 2025-04-08 PROCEDURE — 99214 OFFICE O/P EST MOD 30 MIN: CPT | Mod: GC

## 2025-04-08 PROCEDURE — 3080F DIAST BP >= 90 MM HG: CPT

## 2025-04-08 PROCEDURE — 3008F BODY MASS INDEX DOCD: CPT

## 2025-04-08 PROCEDURE — G2211 COMPLEX E/M VISIT ADD ON: HCPCS

## 2025-04-08 RX ORDER — AMLODIPINE BESYLATE 10 MG/1
10 TABLET ORAL DAILY
Qty: 90 TABLET | Refills: 1 | Status: SHIPPED | OUTPATIENT
Start: 2025-04-08 | End: 2026-04-08

## 2025-04-08 ASSESSMENT — ENCOUNTER SYMPTOMS
LOSS OF SENSATION IN FEET: 0
HEADACHES: 1
OCCASIONAL FEELINGS OF UNSTEADINESS: 0
DEPRESSION: 0

## 2025-04-08 ASSESSMENT — PAIN SCALES - GENERAL: PAINLEVEL_OUTOF10: 0-NO PAIN

## 2025-04-08 NOTE — PROGRESS NOTES
"Subjective   Patient ID:   Diego French is a 44 y.o. male who presents for Follow-up.  HPI  #HTN  - Has a BP cuff at home and reports checking BP every 4 hours  - Reports his home readings are similar to the in office readings   - He reports he is compliant with his medications and only takes 3. Amlodipine, Losartan, and a cholesterol medication. Reports his Losartan bottle only says losartan and not the combination with hydrochlorothiazide. Dispense report shows it was filled recently  - Reports having headaches frequently    #Dry Skin  - Reports that he has dry skin all over his body and is usually itchy and will pick at his skin  - Reports that he used Eucerin cream in the past which helped a lot but cannot afford it      Review of Systems   Neurological:  Positive for headaches.       Objective   BP (!) 166/104 (BP Location: Right arm, Patient Position: Sitting, BP Cuff Size: Adult)   Pulse 100   Temp 36.6 °C (97.9 °F) (Temporal)   Resp 18   Ht 1.778 m (5' 10\")   Wt 94.3 kg (207 lb 12.8 oz)   SpO2 98%   BMI 29.82 kg/m²    Physical Exam  Constitutional:       Appearance: Normal appearance.   HENT:      Head: Normocephalic and atraumatic.   Eyes:      Extraocular Movements: Extraocular movements intact.      Conjunctiva/sclera: Conjunctivae normal.   Cardiovascular:      Rate and Rhythm: Normal rate and regular rhythm.      Heart sounds: Normal heart sounds. No murmur heard.     No friction rub. No gallop.   Pulmonary:      Effort: Pulmonary effort is normal. No respiratory distress.      Breath sounds: Normal breath sounds. No wheezing.   Musculoskeletal:      Cervical back: Normal range of motion.      Right lower leg: No edema.      Left lower leg: No edema.   Skin:     Findings: Lesion present.      Comments: Dry skin with excoriations, scabbed over lesions, and scarring on body   Neurological:      Mental Status: He is alert.   Psychiatric:         Mood and Affect: Mood normal.         Behavior: " Behavior normal.         Assessment/Plan     Problem List Items Addressed This Visit    None  Diego French is a 44 year old male with a PMHx of HTN & HLD who presents to the clinic for a follow up.    #HTN  - IO /104, not at goal  - Home readings not at goal  - Discussed that he does not need to check his BP every few hours and can check it once daily  - Encouraged to record his readings and bring his BP log to his next appointment  - Also encouraged patient bring his medications to his next appointment  - Increased Amlodipine from 5 mg to 10 mg daily  - continue with Losartan-hydrochlorothiazide 50-12.5 mg daily    #Dry skin  - Ordered Eucerin cream    RTC in 1 month for a HM visit and HTN check       The patient was discussed with Dr. Ayala.    Keyon Mcneill MD  Family Medicine   PGY-3

## 2025-04-10 NOTE — PROGRESS NOTES
I saw and evaluated the patient. I personally obtained the key and critical portions of the history and physical exam or was physically present for key and critical portions performed by the resident/fellow. I reviewed the resident/fellow's documentation and discussed the patient with the resident/fellow. I agree with the resident/fellow's medical decision making as documented in the note.    Kecia Ayala MD

## 2025-04-14 ENCOUNTER — TELEPHONE (OUTPATIENT)
Facility: HOSPITAL | Age: 45
End: 2025-04-14
Payer: COMMERCIAL

## 2025-04-14 NOTE — TELEPHONE ENCOUNTER
Copied from CRM #[id number]. Topic: Information Request - Doctor, Hospital, or Provider  >> Apr 14, 2025  1:03 PM Laurita WILLINGHAM wrote:  Pt is having issues hed like to speak with Dr Mcneill regarding.

## 2025-04-23 DIAGNOSIS — M25.561 CHRONIC PAIN OF RIGHT KNEE: ICD-10-CM

## 2025-04-23 DIAGNOSIS — G89.29 CHRONIC PAIN OF RIGHT KNEE: ICD-10-CM

## 2025-04-25 RX ORDER — TIZANIDINE 4 MG/1
4 TABLET ORAL EVERY 6 HOURS PRN
Qty: 30 TABLET | Refills: 2 | Status: SHIPPED | OUTPATIENT
Start: 2025-04-25

## 2025-05-22 ENCOUNTER — OFFICE VISIT (OUTPATIENT)
Facility: HOSPITAL | Age: 45
End: 2025-05-22
Payer: COMMERCIAL

## 2025-05-22 VITALS
DIASTOLIC BLOOD PRESSURE: 126 MMHG | OXYGEN SATURATION: 95 % | BODY MASS INDEX: 29.49 KG/M2 | SYSTOLIC BLOOD PRESSURE: 186 MMHG | WEIGHT: 206 LBS | HEART RATE: 98 BPM | TEMPERATURE: 97 F | HEIGHT: 70 IN

## 2025-05-22 DIAGNOSIS — I10 ESSENTIAL HYPERTENSION: ICD-10-CM

## 2025-05-22 DIAGNOSIS — R10.9 ABDOMINAL PAIN, UNSPECIFIED ABDOMINAL LOCATION: Primary | ICD-10-CM

## 2025-05-22 LAB
POC APPEARANCE, URINE: CLEAR
POC BILIRUBIN, URINE: NEGATIVE
POC BLOOD, URINE: ABNORMAL
POC COLOR, URINE: YELLOW
POC GLUCOSE, URINE: NEGATIVE MG/DL
POC KETONES, URINE: NEGATIVE MG/DL
POC LEUKOCYTES, URINE: NEGATIVE
POC NITRITE,URINE: NEGATIVE
POC PH, URINE: 7 PH
POC PROTEIN, URINE: ABNORMAL MG/DL
POC SPECIFIC GRAVITY, URINE: 1.02
POC UROBILINOGEN, URINE: 0.2 EU/DL

## 2025-05-22 PROCEDURE — 99213 OFFICE O/P EST LOW 20 MIN: CPT | Mod: GE

## 2025-05-22 PROCEDURE — 81003 URINALYSIS AUTO W/O SCOPE: CPT | Mod: QW,GC

## 2025-05-22 RX ORDER — LANOLIN ALCOHOL/MO/W.PET/CERES
CREAM (GRAM) TOPICAL
COMMUNITY
Start: 2024-12-27

## 2025-05-22 RX ORDER — PEDI MULTIVIT 158/IRON/VIT K1 18MG-10MCG
TABLET,CHEWABLE ORAL
COMMUNITY
Start: 2024-12-27

## 2025-05-22 RX ORDER — HYDRALAZINE HYDROCHLORIDE 25 MG/1
TABLET, FILM COATED ORAL
COMMUNITY
Start: 2024-12-27

## 2025-05-22 RX ORDER — SENNOSIDES 8.6 MG
TABLET ORAL
COMMUNITY
Start: 2024-12-27

## 2025-05-22 ASSESSMENT — ENCOUNTER SYMPTOMS
BLOOD IN STOOL: 0
LIGHT-HEADEDNESS: 0
DIZZINESS: 0
COUGH: 0
ENDOCRINE NEGATIVE: 1
EYES NEGATIVE: 1
NAUSEA: 1
ABDOMINAL PAIN: 1
CHILLS: 0
DIARRHEA: 1
FEVER: 0
ABDOMINAL DISTENTION: 0
CONSTIPATION: 0
SHORTNESS OF BREATH: 1
CHEST TIGHTNESS: 0
HEADACHES: 0
VOMITING: 1
UNEXPECTED WEIGHT CHANGE: 1

## 2025-05-22 ASSESSMENT — PAIN SCALES - GENERAL: PAINLEVEL_OUTOF10: 10-WORST PAIN EVER

## 2025-05-22 NOTE — ASSESSMENT & PLAN NOTE
- Abdominal pain located to the LLQ associated with n/v and diarrhea, likely cannabinoid hyperemesis syndrome (pt smokes marijuana daily) vs acute gastroenteritis vs pyelonephritis/cystitis   - CT A/P 6/2022 showed distal portion thickened urinary bladder for degree of distention that could be related to cystitis. Similar degree of bilateral perinephric stranding compared to 07/24/2015. Whether this relates to the same process or new process, the latter scenario being suspicious for acute pyelonephritis.   - UA and cmp ordered

## 2025-05-22 NOTE — ASSESSMENT & PLAN NOTE
- IO /126, pt did not want to talk about blood pressure during visit   - Pt to continue Amlodipine 10 mg daily and Losartan-hydrochlorothiazide 50-12.5 mg daily

## 2025-05-22 NOTE — PROGRESS NOTES
"Patient ID: Diego French is a 45 y.o. male with PMH of HTN who presents for Abdominal Pain.    Subjective     HPI  Abdominal pain:  -Stomach pain and growling  -Just ate at the  cafeteria-ate baked chicken and potatoes  -Associated with diarrhea  -Has not thrown up today, had bowel movement this morning  -Pt says he always gets sent home and \"needs 24 hour supervision\" in the hospital    HTN:  -BP today was 186/126  -Increased dose of amlodipine at last visit, also takes losartan-hydrochlorothiazide, pt reports taking it daily except he didn't take it today    Review of Systems   Constitutional:  Positive for unexpected weight change. Negative for chills and fever.   Eyes: Negative.    Respiratory:  Positive for shortness of breath. Negative for cough and chest tightness.    Cardiovascular:  Negative for chest pain.   Gastrointestinal:  Positive for abdominal pain, diarrhea, nausea and vomiting. Negative for abdominal distention, blood in stool and constipation.   Endocrine: Negative.    Neurological:  Negative for dizziness, syncope, light-headedness and headaches.     Medical History[1]  Surgical History[2]  Family History[3]  Patient has no known allergies.   Social History     Tobacco Use    Smoking status: Some Days     Types: Cigarettes    Smokeless tobacco: Former   Substance Use Topics    Alcohol use: Yes       Medications Ordered Prior to Encounter[4]     Objective   Vitals: BP (!) 186/126 (BP Location: Right arm, Patient Position: Sitting)   Pulse 98   Temp 36.1 °C (97 °F) (Temporal)   Ht 1.778 m (5' 10\")   Wt 93.4 kg (206 lb)   SpO2 95%   BMI 29.56 kg/m²      Physical Exam  Constitutional:       Appearance: Normal appearance.   HENT:      Head: Normocephalic and atraumatic.      Mouth/Throat:      Mouth: Mucous membranes are moist.      Pharynx: Oropharynx is clear.   Eyes:      Extraocular Movements: Extraocular movements intact.      Conjunctiva/sclera: Conjunctivae normal.      Pupils: Pupils " are equal, round, and reactive to light.   Cardiovascular:      Rate and Rhythm: Normal rate and regular rhythm.      Heart sounds: No murmur heard.     No friction rub.   Abdominal:      General: Bowel sounds are normal. There is no distension.      Tenderness: There is abdominal tenderness. There is no right CVA tenderness or left CVA tenderness.   Skin:     General: Skin is warm and dry.      Capillary Refill: Capillary refill takes less than 2 seconds.   Neurological:      Mental Status: He is alert.   Psychiatric:         Mood and Affect: Mood normal.       Assessment/Plan   Problem List Items Addressed This Visit          Cardiac and Vasculature    Essential hypertension    - IO /126, pt did not want to talk about blood pressure during visit   - Pt to continue Amlodipine 10 mg daily and Losartan-hydrochlorothiazide 50-12.5 mg daily            Gastrointestinal and Abdominal    Abdominal pain - Primary    - Abdominal pain located to the LLQ associated with n/v and diarrhea, likely cannabinoid hyperemesis syndrome (pt smokes marijuana daily) vs acute gastroenteritis vs pyelonephritis/cystitis   - CT A/P 6/2022 showed distal portion thickened urinary bladder for degree of distention that could be related to cystitis. Similar degree of bilateral perinephric stranding compared to 07/24/2015. Whether this relates to the same process or new process, the latter scenario being suspicious for acute pyelonephritis.   - UA and cmp ordered          Relevant Orders    Comprehensive metabolic panel    Magnesium    Phosphorus    Amylase    Urinalysis with Reflex Microscopic    POCT UA Automated manually resulted (Completed)     Patient seen and d/w attending Dr. Jamie BATISTA in 6 months with PCP or sooner     Cristy Izquierdo, MS3  I was present and supervised the medical student involved in this documentation. I independently examined this patient on the date of service. I made edits to this documentation where  appropriate and I agree with the above. This patient's assessment and plan were discussed with an attending.    ALEXANDRO Padilla MD   Family Medicine, PGY2         [1]   Past Medical History:  Diagnosis Date    Alcohol abuse, uncomplicated 01/04/2014    Alcohol abuse    Cannabis abuse, in remission     History of cannabis abuse    Encounter for general adult medical examination without abnormal findings 06/08/2015    Encounter for preventive health examination    Fracture of unspecified part of right clavicle, initial encounter for closed fracture 06/30/2014    Fracture of right clavicle    Keratoconus     Other psychoactive substance abuse, in remission     History of phencyclidine abuse    Pain in right shoulder 05/03/2017    Chronic right shoulder pain    Personal history of nicotine dependence 05/27/2019    History of nicotine dependence    Personal history of nicotine dependence 05/27/2019    History of nicotine dependence    Personal history of other diseases of the circulatory system 07/18/2019    History of hypertension    Personal history of other endocrine, nutritional and metabolic disease 08/13/2021    History of hypokalemia   [2] No past surgical history on file.  [3]   Family History  Problem Relation Name Age of Onset    Glaucoma Neg Hx      Macular degeneration Neg Hx     [4]   Current Outpatient Medications on File Prior to Visit   Medication Sig Dispense Refill    Cerovite Jr chewable tablet       hydrALAZINE (Apresoline) 25 mg tablet       hydrocortisone 2.5 % cream APPLY TOPICALLY 2 TIMES A DAY AS NEEDED FOR IRRITATION OR RASH. 30 g 11    senna 8.6 mg tablet       thiamine 100 mg tablet       acetaminophen (Tylenol) 325 mg tablet Take by mouth every 6 hours if needed.      albuterol 2.5 mg /3 mL (0.083 %) nebulizer solution Inhale 3 mL (2.5 mg) every 6 hours if needed.      albuterol 90 mcg/actuation inhaler INHALE 1 TO 2 PUFFS BY MOUTH EVERY 4 TO 6 HOURS AS NEEDED 36 g 1    allopurinol  (Zyloprim) 300 mg tablet TAKE 1 TABLET (300 MG) BY MOUTH ONCE DAILY. 30 tablet 2    amLODIPine (Norvasc) 10 mg tablet Take 1 tablet (10 mg) by mouth once daily. 90 tablet 1    atorvastatin (Lipitor) 80 mg tablet Take 1 tablet (80 mg) by mouth once daily. 90 tablet 3    benzalkonium chloride 0.13 % towelette any kit covered by insurance, use as directed, dx: .9      cetirizine (ZyrTEC) 10 mg tablet Take 1 tablet (10 mg) by mouth once daily.      chlorthalidone (Hygroton) 25 mg tablet Take 1 tablet (25 mg) by mouth once daily. 30 tablet 11    cholecalciferol (Vitamin D-3) 50,000 unit capsule Take by mouth.      colchicine 0.6 mg tablet       Comp-Air Nebulizer Compressor device       cyclobenzaprine (Flexeril) 10 mg tablet Take 1 tablet (10 mg) by mouth 3 times a day as needed for muscle spasms for up to 3 days. 9 tablet 0    ergocalciferol (Vitamin D-2) 1.25 MG (14449 UT) capsule       eucerin cream Apply topically if needed for dry skin. 453 g 1    famotidine (Pepcid) 20 mg tablet Take 1 tablet (20 mg) by mouth 2 times a day. 60 tablet 5    FeroSuL tablet       fluticasone (Flovent) 110 mcg/actuation inhaler INHALE 2 PUFFS BY MOUTH TWICE A DAY 36 g 3    folic acid (Folvite) 1 mg tablet Take 1 tablet (1 mg) by mouth once daily.      gabapentin (Neurontin) 300 mg capsule Take 1 capsule (300 mg) by mouth once daily.      hydrOXYzine HCL (Atarax) 25 mg tablet Take 1 tablet (25 mg) by mouth every 8 hours if needed.      indomethacin (Indocin) 50 mg capsule Take 1 capsule (50 mg) by mouth.      ketoconazole (NIZOral) 2 % shampoo APPLY TOPICALLY 2 TIMES A WEEK. LEAVE ON FOR 5-10 MINUTES, THEN RINSE. 120 mL 0    loratadine (Claritin) 10 mg tablet Take 1 tablet (10 mg) by mouth once daily.      losartan-hydrochlorothiazide (Hyzaar) 50-12.5 mg tablet Take 1 tablet by mouth once daily. 90 tablet 1    metFORMIN (Glucophage) 500 mg tablet Take by mouth.      metoprolol tartrate (Lopressor) 50 mg tablet Take 1 tablet by  mouth 2 times a day.      miscellaneous medical supply [facility] 1 each by Does not apply route once daily. Measure blood pressure once daily, and as needed for symptoms. 1 each 0    multivitamin with minerals (Thera-M) tablet Take 1 tablet by mouth once daily.      naproxen (Naprosyn) 500 mg tablet TAKE 1 TABLET (500 MG) BY MOUTH EVERY 12 HOURS. 60 tablet 2    omeprazole (PriLOSEC) 40 mg DR capsule Take 1 tablet by mouth once daily.      potassium chloride CR 20 mEq ER tablet Take 1 tablet (20 mEq) by mouth once daily. 30 tablet 11    Tab-A-Sweta Multivitamin w-iron 15 mg iron- 400 mcg tablet       tiZANidine (Zanaflex) 4 mg tablet TAKE 1 TABLET (4 MG) BY MOUTH EVERY 6 HOURS IF NEEDED FOR MUSCLE SPASMS. 30 tablet 2    traMADol (Ultram) 50 mg tablet Take by mouth every 6 hours if needed.      traZODone (Desyrel) 50 mg tablet       triamcinolone (Kenalog) 0.1 % cream 1 Application      white petrolatum 61 % cream Apply topically.       No current facility-administered medications on file prior to visit.

## 2025-05-23 ENCOUNTER — DOCUMENTATION (OUTPATIENT)
Facility: HOSPITAL | Age: 45
End: 2025-05-23
Payer: COMMERCIAL

## 2025-05-23 DIAGNOSIS — N17.9 AKI (ACUTE KIDNEY INJURY): Primary | ICD-10-CM

## 2025-05-23 DIAGNOSIS — E83.42 HYPOMAGNESEMIA: ICD-10-CM

## 2025-05-23 LAB
ALBUMIN SERPL-MCNC: 3.8 G/DL (ref 3.6–5.1)
ALP SERPL-CCNC: 59 U/L (ref 36–130)
ALT SERPL-CCNC: 23 U/L (ref 9–46)
AMYLASE SERPL-CCNC: 56 U/L (ref 21–101)
ANION GAP SERPL CALCULATED.4IONS-SCNC: 13 MMOL/L (CALC) (ref 7–17)
APPEARANCE UR: CLEAR
AST SERPL-CCNC: 36 U/L (ref 10–40)
BACTERIA #/AREA URNS HPF: ABNORMAL /HPF
BILIRUB SERPL-MCNC: 0.4 MG/DL (ref 0.2–1.2)
BILIRUB UR QL STRIP: NEGATIVE
BUN SERPL-MCNC: 14 MG/DL (ref 7–25)
CALCIUM SERPL-MCNC: 8.7 MG/DL (ref 8.6–10.3)
CHLORIDE SERPL-SCNC: 103 MMOL/L (ref 98–110)
CO2 SERPL-SCNC: 29 MMOL/L (ref 20–32)
COLOR UR: YELLOW
CREAT SERPL-MCNC: 1.36 MG/DL (ref 0.6–1.29)
EGFRCR SERPLBLD CKD-EPI 2021: 65 ML/MIN/1.73M2
GLUCOSE SERPL-MCNC: 107 MG/DL (ref 65–99)
GLUCOSE UR QL STRIP: NEGATIVE
HGB UR QL STRIP: NEGATIVE
HYALINE CASTS #/AREA URNS LPF: ABNORMAL /LPF
KETONES UR QL STRIP: NEGATIVE
LEUKOCYTE ESTERASE UR QL STRIP: NEGATIVE
MAGNESIUM SERPL-MCNC: 1.4 MG/DL (ref 1.5–2.5)
NITRITE UR QL STRIP: NEGATIVE
PH UR STRIP: 7 [PH] (ref 5–8)
PHOSPHATE SERPL-MCNC: 4.1 MG/DL (ref 2.5–4.5)
POTASSIUM SERPL-SCNC: 4 MMOL/L (ref 3.5–5.3)
PROT SERPL-MCNC: 7.4 G/DL (ref 6.1–8.1)
PROT UR QL STRIP: ABNORMAL
RBC #/AREA URNS HPF: ABNORMAL /HPF
SERVICE CMNT-IMP: ABNORMAL
SODIUM SERPL-SCNC: 145 MMOL/L (ref 135–146)
SP GR UR STRIP: 1.02 (ref 1–1.03)
SQUAMOUS #/AREA URNS HPF: ABNORMAL /HPF
WBC #/AREA URNS HPF: ABNORMAL /HPF

## 2025-05-23 NOTE — PROGRESS NOTES
See results note.  I ordered repeat renal function test and urine albumin/creat ratio because of probable JACEK from vomiting and diarrhea, and proteinuria on urine dip.   Message left on voicemail.  He has a followup appointment at our office in early June.

## 2025-05-23 NOTE — PROGRESS NOTES
"I saw and evaluated the patient. I personally obtained the key and critical portions of the history and physical exam or was physically present for key and critical portions performed by the resident/fellow. I reviewed the resident/fellow's documentation and discussed the patient with the resident/fellow. I agree with the resident/fellow's medical decision making as documented in the note with the exception/addition of the following:  After starting to leave the office in anger, he returned to see us.  He stated he had vomiting and diarrhea for about 1 week, but no vomiting since yesterday.  He had been able to drink water.  He ate a full meal before this visit but has not taken his BP meds today.  Also \"smoked a bud\".  He expressed frustration \"I wish I didn't get so mad!\" He denied using PCP or any other drug.   To my exam, he had only slight LLQ abdominal tenderness, no CVAT and no guarding.  Liver not palpable.  He had a rise of 20 bpm in his pulse rate from lying to sitting (90 to 110) without dizziness.  Mms were moist.  Sclerae anicteric but injected.  Lungs clear and no gallop or jvd.     Urinalysis showed protein and small ketones but no evidence of UTI.   Most likely dx is acute gastoenteritis, now resolving, vs. Cannabinoid-related emesis (which doesn't usually include diarrhea).  WE discussed abstaining from cannabis and any other drugs, not taking anything by mouth for 1 hour after vomiting, getting to ER if not able to drink liquids or feeling worse.   Metabolic panel ordered to R/O renal or liver etiology.  Lipase ordered to R.O pancreatitis (this dx not consistent with exam, though).    Kecia Ayala MD      "

## 2025-06-09 ENCOUNTER — OFFICE VISIT (OUTPATIENT)
Facility: HOSPITAL | Age: 45
End: 2025-06-09
Payer: COMMERCIAL

## 2025-06-09 VITALS
HEIGHT: 70 IN | HEART RATE: 92 BPM | SYSTOLIC BLOOD PRESSURE: 186 MMHG | DIASTOLIC BLOOD PRESSURE: 122 MMHG | BODY MASS INDEX: 28.26 KG/M2 | WEIGHT: 197.4 LBS | OXYGEN SATURATION: 94 % | TEMPERATURE: 97.8 F

## 2025-06-09 DIAGNOSIS — I10 ESSENTIAL HYPERTENSION: ICD-10-CM

## 2025-06-09 DIAGNOSIS — F10.10 ALCOHOL ABUSE: Primary | ICD-10-CM

## 2025-06-09 DIAGNOSIS — I10 PRIMARY HYPERTENSION: ICD-10-CM

## 2025-06-09 PROCEDURE — 3080F DIAST BP >= 90 MM HG: CPT | Performed by: FAMILY MEDICINE

## 2025-06-09 PROCEDURE — 99214 OFFICE O/P EST MOD 30 MIN: CPT | Performed by: FAMILY MEDICINE

## 2025-06-09 PROCEDURE — 3008F BODY MASS INDEX DOCD: CPT | Performed by: FAMILY MEDICINE

## 2025-06-09 PROCEDURE — 3077F SYST BP >= 140 MM HG: CPT | Performed by: FAMILY MEDICINE

## 2025-06-09 RX ORDER — OMEPRAZOLE 40 MG/1
40 CAPSULE, DELAYED RELEASE ORAL DAILY
Qty: 30 CAPSULE | Refills: 11 | Status: SHIPPED | OUTPATIENT
Start: 2025-06-09 | End: 2026-06-09

## 2025-06-09 ASSESSMENT — PAIN SCALES - GENERAL: PAINLEVEL_OUTOF10: 7

## 2025-06-09 NOTE — PROGRESS NOTES
"Subjective   Patient ID: Diego French is a 45 y.o. male who presents for Follow-up (Stomach pain) and Med Refill.    Diego is here for a refill of his antihypertensives. He did not take any medication today. He continues to drink \"fruit\" alcohol drinks. Not as much as before, but his intake remains substantial (2-4 drinks per day). Complains of epigastric pain with no associated nausea, vomiting or other symptoms. He otherwise states he feels generally well.     Med Refill       Review of Systems    Objective   BP (!) 186/122 (BP Location: Right arm, Patient Position: Sitting, BP Cuff Size: Adult)   Pulse 92   Temp 36.6 °C (97.8 °F) (Temporal)   Ht 1.778 m (5' 10\")   Wt 89.5 kg (197 lb 6.4 oz)   SpO2 94%   BMI 28.32 kg/m²     Physical Exam  Abdominal:      Tenderness: There is abdominal tenderness.      Comments: Slight tenderness in epigastrium. Otherwise normal exam.      Assessment/Plan :  45 year old man with difficult to control hypertension. Refilled his amlodipine and chlorthalidone and urged him to take both daily He agreed. Also urged him to cut back to one standard drink per day, which he said he is able to do. FU roughly 2 months. Hepatic function today. Refilled omeprazole. Will consider EGD if he still has abdominal pain and tenderness at next visit.   Diagnoses and all orders for this visit:  Alcohol abuse  -     Hepatic function panel; Future  -     omeprazole (PriLOSEC) 40 mg DR capsule; Take 1 capsule (40 mg) by mouth once daily.  Primary hypertension  -     amLODIPine (Norvasc) 10 mg tablet; Take 1 tablet (10 mg) by mouth once daily.  Essential hypertension  -     chlorthalidone (Hygroton) 25 mg tablet; Take 1 tablet (25 mg) by mouth once daily.         "

## 2025-06-10 RX ORDER — CHLORTHALIDONE 25 MG/1
25 TABLET ORAL DAILY
Qty: 30 TABLET | Refills: 11 | Status: SHIPPED | OUTPATIENT
Start: 2025-06-10 | End: 2026-06-10

## 2025-06-10 RX ORDER — AMLODIPINE BESYLATE 10 MG/1
10 TABLET ORAL DAILY
Qty: 90 TABLET | Refills: 1 | Status: SHIPPED | OUTPATIENT
Start: 2025-06-10 | End: 2026-06-10

## 2025-06-24 DIAGNOSIS — L85.3 DRY SKIN: ICD-10-CM

## 2025-06-25 RX ORDER — LANOLIN ALCOHOL/MO/W.PET/CERES
CREAM (GRAM) TOPICAL
Qty: 454 G | Refills: 1 | Status: SHIPPED | OUTPATIENT
Start: 2025-06-25

## 2025-06-28 DIAGNOSIS — E78.2 MIXED HYPERLIPIDEMIA: ICD-10-CM

## 2025-07-10 DIAGNOSIS — G89.29 CHRONIC PAIN OF RIGHT KNEE: ICD-10-CM

## 2025-07-10 DIAGNOSIS — M25.561 CHRONIC PAIN OF RIGHT KNEE: ICD-10-CM

## 2025-07-10 RX ORDER — TIZANIDINE 4 MG/1
4 TABLET ORAL EVERY 6 HOURS PRN
Qty: 30 TABLET | Refills: 2 | Status: SHIPPED | OUTPATIENT
Start: 2025-07-10

## 2025-07-12 DIAGNOSIS — M25.561 CHRONIC PAIN OF RIGHT KNEE: ICD-10-CM

## 2025-07-12 DIAGNOSIS — M1A.9XX0 CHRONIC GOUT INVOLVING TOE OF RIGHT FOOT WITHOUT TOPHUS, UNSPECIFIED CAUSE: ICD-10-CM

## 2025-07-12 DIAGNOSIS — G89.29 CHRONIC PAIN OF RIGHT KNEE: ICD-10-CM

## 2025-07-14 ENCOUNTER — TELEPHONE (OUTPATIENT)
Facility: HOSPITAL | Age: 45
End: 2025-07-14
Payer: COMMERCIAL

## 2025-07-14 RX ORDER — ALLOPURINOL 300 MG/1
300 TABLET ORAL
Qty: 30 TABLET | Refills: 0 | Status: SHIPPED | OUTPATIENT
Start: 2025-07-14

## 2025-07-14 RX ORDER — NAPROXEN 500 MG/1
500 TABLET ORAL EVERY 12 HOURS
Qty: 60 TABLET | Refills: 0 | Status: SHIPPED | OUTPATIENT
Start: 2025-07-14

## 2025-07-14 NOTE — TELEPHONE ENCOUNTER
Copied from CRM #[id number]. Topic: Information Request - Trying to reach PCP  >> Jul 14, 2025 12:17 PM Ansley RIVER wrote:  Patient trying to reach their PCP to get a px for stomach upset.  PT can be reached at [phone]

## 2025-08-04 ENCOUNTER — TELEPHONE (OUTPATIENT)
Facility: HOSPITAL | Age: 45
End: 2025-08-04

## 2025-08-18 DIAGNOSIS — M1A.9XX0 CHRONIC GOUT INVOLVING TOE OF RIGHT FOOT WITHOUT TOPHUS, UNSPECIFIED CAUSE: ICD-10-CM

## 2025-08-18 DIAGNOSIS — G89.29 CHRONIC PAIN OF RIGHT KNEE: ICD-10-CM

## 2025-08-18 DIAGNOSIS — M25.561 CHRONIC PAIN OF RIGHT KNEE: ICD-10-CM

## 2025-08-20 RX ORDER — ALLOPURINOL 300 MG/1
300 TABLET ORAL
Qty: 30 TABLET | Refills: 2 | Status: SHIPPED | OUTPATIENT
Start: 2025-08-20

## 2025-08-20 RX ORDER — NAPROXEN 500 MG/1
500 TABLET ORAL EVERY 12 HOURS
Qty: 60 TABLET | Refills: 2 | Status: SHIPPED | OUTPATIENT
Start: 2025-08-20